# Patient Record
Sex: MALE | Race: WHITE
[De-identification: names, ages, dates, MRNs, and addresses within clinical notes are randomized per-mention and may not be internally consistent; named-entity substitution may affect disease eponyms.]

---

## 2017-08-17 ENCOUNTER — HOSPITAL ENCOUNTER (INPATIENT)
Dept: HOSPITAL 74 - YASAS | Age: 62
LOS: 4 days | Discharge: HOME | DRG: 897 | End: 2017-08-21
Attending: INTERNAL MEDICINE | Admitting: INTERNAL MEDICINE
Payer: COMMERCIAL

## 2017-08-17 VITALS — BODY MASS INDEX: 27.9 KG/M2

## 2017-08-17 DIAGNOSIS — S29.011D: ICD-10-CM

## 2017-08-17 DIAGNOSIS — G47.00: ICD-10-CM

## 2017-08-17 DIAGNOSIS — I10: ICD-10-CM

## 2017-08-17 DIAGNOSIS — Z86.2: ICD-10-CM

## 2017-08-17 DIAGNOSIS — F17.210: ICD-10-CM
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
## 2017-08-17 DIAGNOSIS — Z96.641: ICD-10-CM

## 2017-08-17 DIAGNOSIS — Z87.438: ICD-10-CM

## 2017-08-17 DIAGNOSIS — F10.230: Primary | ICD-10-CM

## 2017-08-17 LAB
ALBUMIN SERPL-MCNC: 2.1 G/DL (ref 3.4–5)
ALP SERPL-CCNC: 311 U/L (ref 45–117)
ALT SERPL-CCNC: 42 U/L (ref 12–78)
ANION GAP SERPL CALC-SCNC: 9 MMOL/L (ref 8–16)
ANISOCYTOSIS BLD QL: (no result)
AST SERPL-CCNC: 105 U/L (ref 15–37)
BILIRUB SERPL-MCNC: 2.8 MG/DL (ref 0.2–1)
CALCIUM SERPL-MCNC: 8.3 MG/DL (ref 8.5–10.1)
CO2 SERPL-SCNC: 23 MMOL/L (ref 21–32)
CREAT SERPL-MCNC: 0.8 MG/DL (ref 0.7–1.3)
DEPRECATED RDW RBC AUTO: 17.5 % (ref 11.9–15.9)
GLUCOSE SERPL-MCNC: 164 MG/DL (ref 74–106)
MACROCYTES BLD QL: (no result)
MCH RBC QN AUTO: 36 PG (ref 25.7–33.7)
MCHC RBC AUTO-ENTMCNC: 34.6 G/DL (ref 32–35.9)
MCV RBC: 104.1 FL (ref 80–96)
PLATELET # BLD AUTO: 48 K/MM3 (ref 134–434)
PLATELET BLD QL SMEAR: (no result)
PLATELET COMMENT2: (no result)
PLATELET COMMENT3: (no result)
PMV BLD: 11.2 FL (ref 7.5–11.1)
PROT SERPL-MCNC: 7.2 G/DL (ref 6.4–8.2)
WBC # BLD AUTO: 4 K/MM3 (ref 4–10)

## 2017-08-17 PROCEDURE — HZ2ZZZZ DETOXIFICATION SERVICES FOR SUBSTANCE ABUSE TREATMENT: ICD-10-PCS | Performed by: SURGERY

## 2017-08-17 RX ADMIN — Medication SCH MG: at 22:33

## 2017-08-17 NOTE — HP
CIWA Score





- CIWA Score


Nausea/Vomitin-No Nausea/No Vomiting


Muscle Tremors: 4-Moderate,w/Arms Extend


Anxiety: 3


Agitation: 4-Moderately Restless


Paroxysmal Sweats: 3


Orientation: 0-Oriented


Tacttile Disturbances: 0-None


Auditory Disturbances: 0-None


Visual Disturbances: 0-None


Headache: 0-None Present


CIWA-Ar Total Score: 14





Admission ROS [facility]





- HPI


Chief Complaint: 


I know I have a problem and need detox. 


Allergies/Adverse Reactions: 


 Allergies











Allergy/AdvReac Type Severity Reaction Status Date / Time


 


No Known Allergies Allergy   Verified 17 14:30











History of Present Illness: 


pt is a 61yr old male with a history of alcohol dependence seeking detox for 

treatment. Pt received tx at [facility] ED on 17 for muscular pain to his 

left upper chest. cardiac issues.was ruled out. Diagnosis was muscular.  


Exam Limitations: No Limitations





- Ebola screening


Have you traveled outside of the country in the last 21 days: No


Have you had contact with anyone from an Ebola affected area: No


Have you been sick,other than usual withdrawal symptoms: No


Do you have a fever: No





- Review of Systems


Constitutional: Night Sweats, Changes in sleep


EENT: reports: Tearing, Nose Congestion


Respiratory: reports: Cough


Cardiac: reports: Syncope


GI: reports: Poor Appetite, Poor Fluid Intake


: reports: No Symptoms Reported


Musculoskeletal: reports: Joint Pain (right shoulder pain), Muscle Pain (left 

upper chest wall area.)


Integumentary: reports: Flushing, Sweating


Neuro: reports: Tingling, Tremors


Endocrine: reports: Excessive Sweating, Flushing, Intolerance to Cold, 

Intolerance to Heat


Hematology: reports: No Symptoms Reported, Anemia (h/o anemia in the past. not 

taking any iron supplement)


Psychiatric: reports: Judgement Intact, Mood/Affect Appropiate, Orientated x3, 

Agitated, Anxious


Other Systems: Reviewed and Negative





Patient History





- Patient Medical History


Hx Anemia: Yes (in the past )


Hx Asthma: No


Hx Chronic Obstructive Pulmonary Disease (COPD): No


Hx Cancer: No


Hx Cardiac Disorders: No


Hx Congestive Heart Failure: No


Hx Hypertension: No


Hx Hypercholesterolemia: No


Hx Pacemaker: No


HX Cerebrovascular Accident: No


Hx Seizures: No


Hx Dementia: No


Hx Diabetes: No


Hx Gastrointestinal Disorders: No


Hx Liver Disease: No


Hx Genitourinary Disorders: No


Hx Sexually Transmitted Disorders: No (clymadia/gonerrhea in the past )


Hx Renal Disease (ESRD): No


Hx Thyroid Disease: No


Hx Human Immunodeficiency Virus (HIV): No (negative)


Hx Hepatitis C: No (negative)


Hx Depression: No


Hx Suicide Attempt: No (denies)


Hx Bipolar Disorder: No


Hx Schizophrenia: No





- Patient Surgical History


Hx Orthopedic Surgery: Yes (rt total hip replacement )





- PPD History


Previous Implant?: Yes


Documented Results: Negative w/o proof


PPD to be Administered?: Yes





- Reproductive History


Patient is a Female of Child Bearing Age (11 -55 yrs old): No





- Smoking Cessation


Smoking history: Current every day smoker


Have you smoked in the past 12 months: Yes


Aproximately how many cigarettes per day: 2


Hx Chewing Tobacco Use: Yes


Initiated information on smoking cessation: Yes


'Breaking Loose' booklet given: 17





- Substance & Tx. History


Hx Alcohol Use: Yes


Hx Substance Use: No


Substance Use Type: Alcohol


Hx Substance Use Treatment: No (never in detox)





- Substances Abused


  ** Alcohol


Route: Oral


Frequency: Daily


Amount used: 1 six pack beer/1 pint congac /1 pint rum


Age of first use: 16


Date of Last Use: 17





Family Disease History





- Family Disease History


Family Disease History: Diabetes: Father, [place]: Brother (leukemia)





Admission Physical Exam [facility]





- Vital Signs


Vital Signs: 


 Vital Signs - 24 hr











  17





  12:44


 


Temperature 97 F L


 


Pulse Rate 97 H


 


Respiratory 20





Rate 


 


Blood Pressure 117/66














- Physical


General Appearance: Yes: Appropriately Dressed, Moderate Distress, Tremorous, 

Irritable, Sweating, Anxious


HEENTM: Yes: Hearing grossly Normal, Normal Voice, Nasal Congestion, Rhinorrhea


Respiratory: Yes: Lungs Clear, Normal Breath Sounds, No Respiratory Distress


Neck: Yes: No masses,lesions,Nodules


Breast: Yes: Within Normal Limits


Cardiology: Yes: Regular Rhythm, Regular Rate, S1, S2


Abdominal: Yes: Normal Bowel Sounds, Non Tender, Soft


Genitourinary: Yes: Within Normal Limits


Back: Yes: Normal Inspection


Musculoskeletal: Yes: full range of Motion, Muscle Pain (left upper chest area)


Extremities: Yes: Normal Capillary Refill, Normal Inspection, Non-Tender, 

Tremors


Neurological: Yes: Fully Oriented, Alert, Normal Response


Integumentary: Yes: Normal Color, Diaphoresis


Lymphatic: Yes: Within Normal Limits





- Diagnostic


(1) Alcohol dependence with uncomplicated withdrawal


Current Visit: Yes   Status: Chronic





(2) Nicotine dependence


Current Visit: Yes   Status: Chronic   Qualifiers: 


     Nicotine product type: cigarettes     Substance use status: uncomplicated 

       Qualified Code(s): F17.210 - Nicotine dependence, cigarettes, 

uncomplicated  





(3) Hypertension


Current Visit: Yes   Status: Chronic   Qualifiers: 


     Hypertension type: essential hypertension        Qualified Code(s): I10 - 

Essential (primary) hypertension  





(4) Chest wall muscle strain


Current Visit: Yes   Status: Acute   Qualifiers: 


     Encounter type: initial encounter        Qualified Code(s): S29.011A - 

Strain of muscle and tendon of front wall of thorax, initial encounter  


Comment: tx received at [facility] 17 no cardia issue; pt diagnosed with 

muscular strain











Cleared for Admission S





- Detox or Rehab


[facility] Level of Care: Medically Managed


Detox Regimen/Protocol: Librium





[facility] Breath Alcohol Content


Breath Alcohol Content: 0.204





Urine Drug Screen





- Results


Drug Screen Negative: No


Urine Drug Screen Results: BZO-Benzodiazepines

## 2017-08-18 LAB
APPEARANCE UR: CLEAR
BILIRUB UR STRIP.AUTO-MCNC: NEGATIVE MG/DL
COLOR UR: (no result)
HIV 1 & 2 AB: NEGATIVE
HIV 1 AGP24: NEGATIVE
KETONES UR QL STRIP: NEGATIVE
LEUKOCYTE ESTERASE UR QL STRIP.AUTO: NEGATIVE
NITRITE UR QL STRIP: NEGATIVE
PH UR: 6 [PH] (ref 5–8)
PROT UR QL STRIP: NEGATIVE
PROT UR QL STRIP: NEGATIVE
RBC # UR STRIP: NEGATIVE /UL
SP GR UR: <= 1.005 (ref 1–1.02)
UROBILINOGEN UR STRIP-MCNC: NEGATIVE MG/DL (ref 0.2–1)

## 2017-08-18 RX ADMIN — Medication SCH TAB: at 10:57

## 2017-08-18 RX ADMIN — FERROUS SULFATE TAB EC 324 MG (65 MG FE EQUIVALENT) SCH MG: 324 (65 FE) TABLET DELAYED RESPONSE at 13:00

## 2017-08-18 RX ADMIN — NICOTINE SCH: 7 PATCH TRANSDERMAL at 11:00

## 2017-08-18 RX ADMIN — FERROUS SULFATE TAB EC 324 MG (65 MG FE EQUIVALENT) SCH MG: 324 (65 FE) TABLET DELAYED RESPONSE at 18:35

## 2017-08-18 RX ADMIN — METOPROLOL SUCCINATE SCH MG: 100 TABLET, EXTENDED RELEASE ORAL at 10:57

## 2017-08-18 RX ADMIN — ZOLPIDEM TARTRATE PRN MG: 5 TABLET, COATED ORAL at 22:18

## 2017-08-18 RX ADMIN — Medication SCH MG: at 22:18

## 2017-08-18 NOTE — EKG
Test Reason : 

Blood Pressure : ***/*** mmHG

Vent. Rate : 073 BPM     Atrial Rate : 073 BPM

   P-R Int : 160 ms          QRS Dur : 092 ms

    QT Int : 414 ms       P-R-T Axes : 057 015 036 degrees

   QTc Int : 456 ms

 

NORMAL SINUS RHYTHM

NORMAL ECG

NO PREVIOUS ECGS AVAILABLE

Confirmed by AMADOR PACHECO MD ([id number]) on 8/18/2017 10:31:21 AM

 

Referred By:             Confirmed By:AMADOR PACHECO MD

## 2017-08-18 NOTE — CONSULT
[facility] Psychiatric Consult





- Data


Date of interview: 08/18/17


Admission source: [facility]


Identifying data: First admission to [facility] for this 62 y/o  male 

seeking detox treatment on [address] for alcohol and marihuana dependence.Patient 

is in a long standing common-law relationship,father of seven,domiciled and 

currently retired (supported on his pension benefits).


Substance Abuse History: Discussed with patient in this interview.Mr Donohue 

declares that this report is accurate about his addictions.  Smoking Cessation.

  Smoking history: Current every day smoker.  Have you smoked in the past 12 

months: Yes.  Aproximately how many cigarettes per day: 2.  Hx Chewing Tobacco 

Use: Yes.  Initiated information on smoking cessation: Yes.  'Breaking Loose' 

booklet given: 08/17/17.  - Substance & Tx. History.  Hx Alcohol Use: Yes.  Hx 

Substance Use: No.  Substance Use Type: Alcohol.  Hx Substance Use Treatment: 

No (never in detox).  - Substances Abused.  ** Alcohol.  Route: Oral.  Frequency

: Daily.  Amount used: 1 six pack beer/1 pint congac /1 pint rum.  Age of first 

use: 16.  Date of Last Use: 08/17/17


Medical History: Remarkable for hepatitis C,history of anemia,past treatment 

for chlamydia/gonorrhea and total right hip replacement (2001).


Psychiatric History: Patient denies.


Physical/Sexual Abuse/Trauma History: Patient denies.


Additional Comment: Urine Drug Screen Results: BZO-Benzodiazepines.Noted.





Mental Status Exam





- Mental Status Exam


Alert and Oriented to: Time, Place, Person


Cognitive Function: Good


Patient Appearance: Well Groomed


Mood: Nervous, Withdrawn, Anxious


Affect: Mood Congruent


Patient Behavior: Fatigued, Appropriate, Cooperative


Speech Pattern: Clear, Appropriate


Voice Loudness: Normal


Thought Process: Intact, Goal Oriented


Thought Disorder: Not Present


Hallucinations: Denies


Suicidal Ideation: Denies


Homicidal Ideation: Denies


Insight/Judgement: Poor


Sleep: Poorly, Difficulty falling asleep


Appetite: Good


Muscle strength/Tone: Normal


Gait/Station: Normal





Psychiatric Findings





- Problem List (Axis 1, 2,3)


(1) Alcohol dependence with uncomplicated withdrawal


Current Visit: Yes   Status: Acute





(2) Nicotine dependence


Current Visit: Yes   Status: Acute   Qualifiers: 


     Nicotine product type: cigarettes     Substance use status: uncomplicated 

       Qualified Code(s): F17.210 - Nicotine dependence, cigarettes, 

uncomplicated  





(3) Chest wall muscle strain


Current Visit: Yes   Status: Acute   Qualifiers: 


     Encounter type: initial encounter        Qualified Code(s): S29.011A - 

Strain of muscle and tendon of front wall of thorax, initial encounter  


Comment: tx received at [facility] 8/9/17 no cardia issue; pt diagnosed with 

muscular strain








(4) Hypertension


Current Visit: Yes   Status: Chronic   Qualifiers: 


     Hypertension type: essential hypertension        Qualified Code(s): I10 - 

Essential (primary) hypertension  





(5) Insomnia


Current Visit: Yes   Status: Acute   








- Initial Treatment Plan


Initial Treatment Plan: Psychoeducation.Detoxification.Ambien 5 mg po hs 

prn.Patient is made aware of potential for parasomnias.He is in agreement with 

this careplan.Observation.

## 2017-08-18 NOTE — PN
S CIWA





- CIWA Score


Nausea/Vomitin-No Nausea/No Vomiting


Muscle Tremors: 4-Moderate,w/Arms Extend


Anxiety: 3


Agitation: 4-Moderately Restless


Paroxysmal Sweats: 3


Orientation: 0-Oriented


Tacttile Disturbances: 0-None


Auditory Disturbances: 0-None


Visual Disturbances: 0-None


Headache: 0-None Present


CIWA-Ar Total Score: 14





[facility] Progress Note (SOAP)


Subjective: 


sweats


tired


agitation


interrupted sleep


body aches


Objective: 





17 11:23


 Vital Signs











Temperature  98.1 F   17 10:00


 


Pulse Rate  88   17 10:00


 


Respiratory Rate  18   17 10:00


 


Blood Pressure  135/73   17 10:00


 


O2 Sat by Pulse Oximetry (%)      








 Laboratory Tests











  17





  14:00 14:00 14:00


 


WBC   4.0 


 


RBC   2.84 L 


 


Hgb   10.2 L 


 


Hct   29.5 L 


 


MCV   104.1 H 


 


MCH   36.0 H 


 


MCHC   34.6 


 


RDW   17.5 H 


 


Plt Count   48 L 


 


MPV   11.2 H 


 


Platelet Estimate   Decreased 


 


Platelet Comment   No clotting detected 


 


Anisocytosis   1+ 


 


Macrocytosis   1+ 


 


Sodium   


 


Potassium   


 


Chloride   


 


Carbon Dioxide   


 


Anion Gap   


 


BUN   


 


Creatinine   


 


Creat Clearance w eGFR   


 


Random Glucose   


 


Calcium   


 


Total Bilirubin   


 


AST   


 


ALT   


 


Alkaline Phosphatase   


 


Total Protein   


 


Albumin   


 


Urine Color   


 


Urine Appearance   


 


Urine pH   


 


Urine Protein   


 


Urine Glucose (UA)   


 


Urine Ketones   


 


Urine Blood   


 


Urine Nitrite   


 


Urine Bilirubin   


 


Urine Urobilinogen   


 


Ur Leukocyte Esterase   


 


RPR Titer    Nonreactive


 


HIV 1&2 Antibody Screen  Negative  


 


HIV P24 Antigen  Negative  














  17





  14:00 23:56


 


WBC  


 


RBC  


 


Hgb  


 


Hct  


 


MCV  


 


MCH  


 


MCHC  


 


RDW  


 


Plt Count  


 


MPV  


 


Platelet Estimate  


 


Platelet Comment  


 


Anisocytosis  


 


Macrocytosis  


 


Sodium  144 


 


Potassium  4.4 


 


Chloride  112 H 


 


Carbon Dioxide  23 


 


Anion Gap  9 


 


BUN  10 


 


Creatinine  0.8 


 


Creat Clearance w eGFR  > 60 


 


Random Glucose  164 H 


 


Calcium  8.3 L 


 


Total Bilirubin  2.8 H 


 


AST  105 H 


 


ALT  42 


 


Alkaline Phosphatase  311 H 


 


Total Protein  7.2 


 


Albumin  2.1 L 


 


Urine Color   Straw


 


Urine Appearance   Clear


 


Urine pH   6.0


 


Urine Protein   Negative


 


Urine Glucose (UA)   Negative


 


Urine Ketones   Negative


 


Urine Blood   Negative


 


Urine Nitrite   Negative


 


Urine Bilirubin   Negative


 


Urine Urobilinogen   Negative


 


Ur Leukocyte Esterase   Negative


 


RPR Titer  


 


HIV 1&2 Antibody Screen  


 


HIV P24 Antigen  








low H:H; iron supplement ordered


awake/alert


ambulating


no acute distress


Assessment: 





17 11:23


withdrawal sx


Plan: 


continue detox


increase fluids


iron supplement ordered 


repeat labs for .

## 2017-08-19 RX ADMIN — CYCLOBENZAPRINE HYDROCHLORIDE SCH MG: 10 TABLET, FILM COATED ORAL at 22:11

## 2017-08-19 RX ADMIN — METOPROLOL SUCCINATE SCH MG: 100 TABLET, EXTENDED RELEASE ORAL at 10:11

## 2017-08-19 RX ADMIN — CYCLOBENZAPRINE HYDROCHLORIDE SCH MG: 10 TABLET, FILM COATED ORAL at 14:41

## 2017-08-19 RX ADMIN — FERROUS SULFATE TAB EC 324 MG (65 MG FE EQUIVALENT) SCH: 324 (65 FE) TABLET DELAYED RESPONSE at 17:55

## 2017-08-19 RX ADMIN — ZOLPIDEM TARTRATE PRN MG: 5 TABLET, COATED ORAL at 22:10

## 2017-08-19 RX ADMIN — Medication SCH MG: at 22:10

## 2017-08-19 RX ADMIN — Medication SCH TAB: at 10:11

## 2017-08-19 RX ADMIN — NICOTINE SCH: 7 PATCH TRANSDERMAL at 10:11

## 2017-08-19 RX ADMIN — FERROUS SULFATE TAB EC 324 MG (65 MG FE EQUIVALENT) SCH MG: 324 (65 FE) TABLET DELAYED RESPONSE at 11:40

## 2017-08-19 RX ADMIN — FERROUS SULFATE TAB EC 324 MG (65 MG FE EQUIVALENT) SCH MG: 324 (65 FE) TABLET DELAYED RESPONSE at 07:37

## 2017-08-19 NOTE — PN
[facility] CIWA





- CIWA Score


Nausea/Vomiting: 3


Muscle Tremors: 3


Anxiety: 3


Agitation: 2


Paroxysmal Sweats: 1-Minimal Palms Moist


Orientation: 0-Oriented


Tacttile Disturbances: 1-Very Mild Itch/Numbness


Auditory Disturbances: 1-Very Mild


Visual Disturbances: 1-Very Mild Sensitivity


Headache: 2-Mild


CIWA-Ar Total Score: 17





BHS Progress Note (SOAP)


Subjective: 


alert,irritable,anxious,interrupted sleep,tremor


Objective: 


08/19/17 13:49


 Vital Signs











Temperature  98.1 F   08/19/17 09:56


 


Pulse Rate  90   08/19/17 09:56


 


Respiratory Rate  16   08/19/17 09:56


 


Blood Pressure  117/79   08/19/17 09:56


 


O2 Sat by Pulse Oximetry (%)      








ekg nsr,normal ecg





08/19/17 13:50


 Laboratory Last Values











WBC  4.0 K/mm3 (4.0-10.0)   08/17/17  14:00    


 


RBC  2.84 M/mm3 (4.00-5.60)  L  08/17/17  14:00    


 


Hgb  10.2 GM/dL (11.7-16.9)  L  08/17/17  14:00    


 


Hct  29.5 % (35.4-49)  L  08/17/17  14:00    


 


MCV  104.1 fl (80-96)  H  08/17/17  14:00    


 


MCH  36.0 pg (25.7-33.7)  H  08/17/17  14:00    


 


MCHC  34.6 g/dl (32.0-35.9)   08/17/17  14:00    


 


RDW  17.5 % (11.9-15.9)  H  08/17/17  14:00    


 


Plt Count  48 K/MM3 (134-434)  L  08/17/17  14:00    


 


MPV  11.2 fl (7.5-11.1)  H  08/17/17  14:00    


 


Platelet Estimate  Decreased   08/17/17  14:00    


 


Platelet Comment  Few large plts   08/17/17  14:00    


 


Platelet Comment  No clotting detected   08/17/17  14:00    


 


Anisocytosis  1+   08/17/17  14:00    


 


Macrocytosis  1+   08/17/17  14:00    


 


Sodium  144 mmol/L (136-145)   08/17/17  14:00    


 


Potassium  4.4 mmol/L (3.5-5.1)   08/17/17  14:00    


 


Chloride  112 mmol/L ()  H  08/17/17  14:00    


 


Carbon Dioxide  23 mmol/L (21-32)   08/17/17  14:00    


 


Anion Gap  9  (8-16)   08/17/17  14:00    


 


BUN  10 mg/dL (7-18)   08/17/17  14:00    


 


Creatinine  0.8 mg/dL (0.7-1.3)   08/17/17  14:00    


 


Creat Clearance w eGFR  > 60  (>60)   08/17/17  14:00    


 


Random Glucose  164 mg/dL ()  H  08/17/17  14:00    


 


Calcium  8.3 mg/dL (8.5-10.1)  L  08/17/17  14:00    


 


Total Bilirubin  2.8 mg/dL (0.2-1.0)  H  08/17/17  14:00    


 


AST  105 U/L (15-37)  H  08/17/17  14:00    


 


ALT  42 U/L (12-78)   08/17/17  14:00    


 


Alkaline Phosphatase  311 U/L ()  H  08/17/17  14:00    


 


Total Protein  7.2 g/dl (6.4-8.2)   08/17/17  14:00    


 


Albumin  2.1 g/dl (3.4-5.0)  L  08/17/17  14:00    


 


Urine Color  Straw   08/17/17  23:56    


 


Urine Appearance  Clear   08/17/17  23:56    


 


Urine pH  6.0  (5.0-8.0)   08/17/17  23:56    


 


Ur Specific Gravity  <= 1.005  (1.005-1.025)   08/17/17  23:56    


 


Urine Protein  Negative  (NEGATIVE)   08/17/17  23:56    


 


Urine Glucose (UA)  Negative  (NEGATIVE)   08/17/17  23:56    


 


Urine Ketones  Negative  (NEGATIVE)   08/17/17  23:56    


 


Urine Blood  Negative  (NEGATIVE)   08/17/17  23:56    


 


Urine Nitrite  Negative  (NEGATIVE)   08/17/17  23:56    


 


Urine Bilirubin  Negative  (NEGATIVE)   08/17/17  23:56    


 


Urine Urobilinogen  Negative mg/dL (0.2-1.0)   08/17/17  23:56    


 


Ur Leukocyte Esterase  Negative  (NEGATIVE)   08/17/17  23:56    


 


RPR Titer  Nonreactive  (NONREACTIVE)   08/17/17  14:00    


 


HIV 1&2 Antibody Screen  Negative   08/17/17  14:00    


 


HIV P24 Antigen  Negative   08/17/17  14:00    











Assessment: 





08/19/17 13:52


withdrawal symptom


Plan: 


continue detox,repeat cbc,cmp,inr in am

## 2017-08-20 LAB
ALBUMIN SERPL-MCNC: 1.9 G/DL (ref 3.4–5)
ALP SERPL-CCNC: 220 U/L (ref 45–117)
ALT SERPL-CCNC: 33 U/L (ref 12–78)
ANION GAP SERPL CALC-SCNC: 7 MMOL/L (ref 8–16)
AST SERPL-CCNC: 77 U/L (ref 15–37)
BILIRUB SERPL-MCNC: 3.9 MG/DL (ref 0.2–1)
CALCIUM SERPL-MCNC: 8.3 MG/DL (ref 8.5–10.1)
CO2 SERPL-SCNC: 27 MMOL/L (ref 21–32)
CREAT SERPL-MCNC: 0.7 MG/DL (ref 0.7–1.3)
DEPRECATED RDW RBC AUTO: 16.4 % (ref 11.9–15.9)
GLUCOSE SERPL-MCNC: 94 MG/DL (ref 74–106)
MCH RBC QN AUTO: 36 PG (ref 25.7–33.7)
MCHC RBC AUTO-ENTMCNC: 34.5 G/DL (ref 32–35.9)
MCV RBC: 104.4 FL (ref 80–96)
PLATELET # BLD AUTO: 42 K/MM3 (ref 134–434)
PLATELET BLD QL SMEAR: (no result)
PMV BLD: 11.4 FL (ref 7.5–11.1)
PROT SERPL-MCNC: 6.5 G/DL (ref 6.4–8.2)
TOTAL CELLS COUNTED BLD: 100
WBC # BLD AUTO: 3.3 K/MM3 (ref 4–10)

## 2017-08-20 RX ADMIN — CYCLOBENZAPRINE HYDROCHLORIDE SCH MG: 10 TABLET, FILM COATED ORAL at 06:58

## 2017-08-20 RX ADMIN — LACTULOSE SCH GM: 20 SOLUTION ORAL at 11:13

## 2017-08-20 RX ADMIN — METOPROLOL SUCCINATE SCH MG: 100 TABLET, EXTENDED RELEASE ORAL at 10:21

## 2017-08-20 RX ADMIN — LACTULOSE SCH GM: 20 SOLUTION ORAL at 22:08

## 2017-08-20 RX ADMIN — FERROUS SULFATE TAB EC 324 MG (65 MG FE EQUIVALENT) SCH: 324 (65 FE) TABLET DELAYED RESPONSE at 17:17

## 2017-08-20 RX ADMIN — NICOTINE SCH: 7 PATCH TRANSDERMAL at 10:21

## 2017-08-20 RX ADMIN — FERROUS SULFATE TAB EC 324 MG (65 MG FE EQUIVALENT) SCH MG: 324 (65 FE) TABLET DELAYED RESPONSE at 11:13

## 2017-08-20 RX ADMIN — CYCLOBENZAPRINE HYDROCHLORIDE SCH: 10 TABLET, FILM COATED ORAL at 22:09

## 2017-08-20 RX ADMIN — Medication SCH TAB: at 10:21

## 2017-08-20 RX ADMIN — Medication SCH MG: at 22:07

## 2017-08-20 RX ADMIN — FERROUS SULFATE TAB EC 324 MG (65 MG FE EQUIVALENT) SCH MG: 324 (65 FE) TABLET DELAYED RESPONSE at 07:10

## 2017-08-20 RX ADMIN — CYCLOBENZAPRINE HYDROCHLORIDE SCH MG: 10 TABLET, FILM COATED ORAL at 13:23

## 2017-08-20 NOTE — PN
BHS Progress Note (SOAP)


Subjective: 


ALERT,IRRITABLE,ANXIOUS,INTERRUPTED SLEEP,


Objective: 





08/20/17 13:59


 Vital Signs











Temperature  97.9 F   08/20/17 10:44


 


Pulse Rate  73   08/20/17 10:44


 


Respiratory Rate  20 08/20/17 10:44


 


Blood Pressure  103/66   08/20/17 10:44


 


O2 Sat by Pulse Oximetry (%)      








 Abnormal Lab Results











  08/19/17 08/20/17 08/20/17





  16:40 07:45 07:45


 


WBC   3.3 L 


 


RBC   2.83 L 


 


Hgb   10.2 L 


 


Hct   29.5 L 


 


MCV   104.4 H 


 


MCH   36.0 H 


 


RDW   16.4 H 


 


Plt Count   42 L 


 


MPV   11.4 H 


 


Monocytes % (Manual)   11 H 


 


Anion Gap    7 L


 


Calcium    8.3 L


 


Total Bilirubin    3.9 H D


 


AST    77 H D


 


Alkaline Phosphatase    220 H D


 


Ammonia  51.05 H  


 


Albumin    1.9 L











Assessment: 





08/20/17 13:59


CONTINUE DETOX


Plan: 


CONTINUE DETOX,LACTULOSE 20 GRAMS PO BID,DISCHARGE IN AM

## 2017-08-21 ENCOUNTER — HOSPITAL ENCOUNTER (INPATIENT)
Dept: HOSPITAL 74 - YASAS | Age: 62
LOS: 10 days | Discharge: HOME | DRG: 895 | End: 2017-08-31
Attending: PSYCHIATRY & NEUROLOGY | Admitting: PSYCHIATRY & NEUROLOGY
Payer: COMMERCIAL

## 2017-08-21 VITALS — BODY MASS INDEX: 28 KG/M2

## 2017-08-21 VITALS — DIASTOLIC BLOOD PRESSURE: 51 MMHG | SYSTOLIC BLOOD PRESSURE: 94 MMHG | TEMPERATURE: 97.2 F | HEART RATE: 61 BPM

## 2017-08-21 DIAGNOSIS — F17.210: ICD-10-CM

## 2017-08-21 DIAGNOSIS — K64.8: ICD-10-CM

## 2017-08-21 DIAGNOSIS — G47.00: ICD-10-CM

## 2017-08-21 DIAGNOSIS — L25.9: ICD-10-CM

## 2017-08-21 DIAGNOSIS — F31.9: ICD-10-CM

## 2017-08-21 DIAGNOSIS — R07.89: ICD-10-CM

## 2017-08-21 DIAGNOSIS — I10: ICD-10-CM

## 2017-08-21 DIAGNOSIS — F10.20: Primary | ICD-10-CM

## 2017-08-21 DIAGNOSIS — F32.9: ICD-10-CM

## 2017-08-21 PROCEDURE — HZ42ZZZ GROUP COUNSELING FOR SUBSTANCE ABUSE TREATMENT, COGNITIVE-BEHAVIORAL: ICD-10-PCS | Performed by: PSYCHIATRY & NEUROLOGY

## 2017-08-21 PROCEDURE — G0009 ADMIN PNEUMOCOCCAL VACCINE: HCPCS

## 2017-08-21 RX ADMIN — Medication SCH MG: at 21:27

## 2017-08-21 RX ADMIN — LACTULOSE SCH GM: 20 SOLUTION ORAL at 09:22

## 2017-08-21 RX ADMIN — FERROUS SULFATE TAB EC 324 MG (65 MG FE EQUIVALENT) SCH MG: 324 (65 FE) TABLET DELAYED RESPONSE at 16:56

## 2017-08-21 RX ADMIN — FERROUS SULFATE TAB EC 324 MG (65 MG FE EQUIVALENT) SCH MG: 324 (65 FE) TABLET DELAYED RESPONSE at 07:19

## 2017-08-21 RX ADMIN — NICOTINE SCH: 7 PATCH TRANSDERMAL at 09:22

## 2017-08-21 RX ADMIN — CYCLOBENZAPRINE HYDROCHLORIDE SCH MG: 10 TABLET, FILM COATED ORAL at 07:19

## 2017-08-21 RX ADMIN — METOPROLOL SUCCINATE SCH MG: 100 TABLET, EXTENDED RELEASE ORAL at 09:20

## 2017-08-21 RX ADMIN — Medication SCH TAB: at 09:20

## 2017-08-21 RX ADMIN — LACTULOSE SCH GM: 20 SOLUTION ORAL at 21:26

## 2017-08-21 RX ADMIN — FUROSEMIDE SCH MG: 40 TABLET ORAL at 16:56

## 2017-08-21 NOTE — HP
[facility] MD Rehab Assess/Revision





- Admission History


Admitted to Rehab from: [facility] [address] [facility]


Date of Admission to Rehab: 08/21/17





- Vital signs


Vital Signs: 


 Vital Signs











 Period  Temp  Pulse  Resp  BP Sys/Camara  Pulse Ox


 


 Last 24 Hr  97.6 F  59  20  148/62  














- Findings


Detox History & Physical reviewed: Yes


Concur with findings: Yes


Comments/Additional Findings: for rehab as protocol

## 2017-08-21 NOTE — DS
[facility] Detox Discharge Summary


Admission Date: 


08/17/17





Discharge Date: 08/21/17





- History


Present History: Alcohol Dependence





- Physical Exam Results


Vital Signs: 


 Vital Signs











Temperature  96.4 F L  08/21/17 06:52


 


Pulse Rate  64   08/21/17 06:52


 


Respiratory Rate  16   08/21/17 06:52


 


Blood Pressure  100/50   08/21/17 06:52


 


O2 Sat by Pulse Oximetry (%)      














- Treatment


Hospital Course: Detox Protocol Followed, Detoxed Safely, Responded well, 

Discharged Condition Good, Rehab Referral Accepted





- Medication


Discharge Medications: 


Ambulatory Orders





Metoprolol Succinate [Toprol XL -] 100 mg PO DAILY 08/17/17 


Ferrous Sulfate [Feosol] 325 mg PO TIDCM #90 mg 08/21/17 


Lactulose (Oral Use) [Cephulac -] 20 gm PO BID #1 bottle 08/21/17 











- Diagnosis


(1) Alcohol dependence with uncomplicated withdrawal


Current Visit: Yes   Status: Chronic





(2) Nicotine dependence


Current Visit: Yes   Status: Chronic   Qualifiers: 


     Nicotine product type: cigarettes     Substance use status: uncomplicated 

       Qualified Code(s): F17.210 - Nicotine dependence, cigarettes, 

uncomplicated  





(3) Hypertension


Current Visit: Yes   Status: Chronic   Qualifiers: 


     Hypertension type: essential hypertension        Qualified Code(s): I10 - 

Essential (primary) hypertension  





(4) Chest wall muscle strain


Current Visit: Yes   Status: Acute   Qualifiers: 


     Encounter type: initial encounter        Qualified Code(s): S29.011A - 

Strain of muscle and tendon of front wall of thorax, initial encounter  








- AMA


Did Patient Leave Against Medical Advice: No

## 2017-08-22 RX ADMIN — LACTULOSE SCH GM: 20 SOLUTION ORAL at 09:44

## 2017-08-22 RX ADMIN — Medication SCH TAB: at 09:44

## 2017-08-22 RX ADMIN — METOPROLOL SUCCINATE SCH: 100 TABLET, EXTENDED RELEASE ORAL at 09:46

## 2017-08-22 RX ADMIN — LACTULOSE SCH GM: 20 SOLUTION ORAL at 21:45

## 2017-08-22 RX ADMIN — Medication SCH MG: at 21:44

## 2017-08-22 RX ADMIN — FERROUS SULFATE TAB EC 324 MG (65 MG FE EQUIVALENT) SCH MG: 324 (65 FE) TABLET DELAYED RESPONSE at 17:17

## 2017-08-22 RX ADMIN — FERROUS SULFATE TAB EC 324 MG (65 MG FE EQUIVALENT) SCH MG: 324 (65 FE) TABLET DELAYED RESPONSE at 12:19

## 2017-08-22 RX ADMIN — FUROSEMIDE SCH: 40 TABLET ORAL at 09:45

## 2017-08-22 RX ADMIN — CYCLOBENZAPRINE HYDROCHLORIDE PRN MG: 10 TABLET, FILM COATED ORAL at 01:01

## 2017-08-22 RX ADMIN — FERROUS SULFATE TAB EC 324 MG (65 MG FE EQUIVALENT) SCH MG: 324 (65 FE) TABLET DELAYED RESPONSE at 07:03

## 2017-08-22 RX ADMIN — IBUPROFEN PRN MG: 400 TABLET, FILM COATED ORAL at 00:58

## 2017-08-22 NOTE — HP
Psychiatrist Admission





- Data


Date of interview: 08/22/17


Admission source: [facility]


Identifying data: This is the first [facility] inpatient rehabilitation admission for 

this 61 year old  male,  father of 7, residing in [facility]. 

Patient is in a long standing common-law relationship and currently retired (

supported on his pension benefits)


Medical History: Remarkable for hepatitis C,history of anemia,past treatment 

for chlamydia/gonorrhea and total right hip replacement (2001). Smokes 2 

cigaretets a day.


Psychiatric History: Patient denies history of psychiatric treatment.


Physical/Sexual Abuse/Trauma History: Patient denies history of abuse.


Vital Signs: 


 Vital Signs - 24 hr











  08/21/17 08/21/17 08/22/17





  13:06 16:00 03:30


 


Temperature 97.6 F  


 


Pulse Rate 59 L 57 L 


 


Respiratory 20  20





Rate   


 


Blood Pressure 148/62 101/58 














  08/22/17





  06:58


 


Temperature 97.7 F


 


Pulse Rate 64


 


Respiratory 18





Rate 


 


Blood Pressure 94/62











Allergies/Adverse Reactions: 


 Allergies











Allergy/AdvReac Type Severity Reaction Status Date / Time


 


No Known Allergies Allergy   Verified 08/17/17 14:30











Date of last physical exam: 08/17/17


Concur with the findings of this exam: Yes





- Substance Abuse/Tx History


Hx Alcohol Use: Yes


Hx Substance Use: No


Substance Use Type: Alcohol (1 six pack beer/1 pint congac /1 pint rum.  Age of 

first use - 16)


Hx Substance Use Treatment: No (this is his first rehab.)





- Admission Criteria


Previous failed treatment: No


Poor recovery environment: Yes


Comorbidities: No


Lacks judgement: Yes





Mental Status Exam





- Mental Status Exam


Alert and Oriented to: Time, Place, Person


Cognitive Function: Grossly Intact


Patient Appearance: Well Groomed


Mood: Sad


Affect: Appropriate, Mood Congruent


Patient Behavior: Appropriate, Cooperative


Speech Pattern: Appropriate


Voice Loudness: Normal


Thought Process: Goal Oriented


Thought Disorder: Not Present


Hallucinations: Denies


Suicidal Ideation: Denies


Homicidal Ideation: Denies


Insight/Judgement: Fair


Sleep: Fair


Appetite: Fair


Muscle strength/Tone: Normal


Gait/Station: Normal





Psychiatric Findings





- Problem List (Axis 1, 2,3)


(1) Hypertension


Current Visit: No   Status: Chronic   Qualifiers: 


   





(2) Nicotine dependence


Current Visit: No   Status: Chronic   Qualifiers: 


   





(3) Alcohol dependence


Current Visit: Yes   Status: Acute   








- Initial Treatment Plan


Initial Treatment Plan: will monitor rpogress as needed.

## 2017-08-23 RX ADMIN — METOPROLOL SUCCINATE SCH MG: 100 TABLET, EXTENDED RELEASE ORAL at 11:19

## 2017-08-23 RX ADMIN — LACTULOSE SCH: 20 SOLUTION ORAL at 23:50

## 2017-08-23 RX ADMIN — Medication SCH: at 23:50

## 2017-08-23 RX ADMIN — FERROUS SULFATE TAB EC 324 MG (65 MG FE EQUIVALENT) SCH MG: 324 (65 FE) TABLET DELAYED RESPONSE at 07:29

## 2017-08-23 RX ADMIN — FERROUS SULFATE TAB EC 324 MG (65 MG FE EQUIVALENT) SCH MG: 324 (65 FE) TABLET DELAYED RESPONSE at 12:20

## 2017-08-23 RX ADMIN — Medication SCH TAB: at 09:45

## 2017-08-23 RX ADMIN — FUROSEMIDE SCH MG: 40 TABLET ORAL at 09:45

## 2017-08-23 RX ADMIN — FERROUS SULFATE TAB EC 324 MG (65 MG FE EQUIVALENT) SCH MG: 324 (65 FE) TABLET DELAYED RESPONSE at 17:04

## 2017-08-23 RX ADMIN — LACTULOSE SCH GM: 20 SOLUTION ORAL at 09:45

## 2017-08-24 RX ADMIN — FERROUS SULFATE TAB EC 324 MG (65 MG FE EQUIVALENT) SCH MG: 324 (65 FE) TABLET DELAYED RESPONSE at 07:17

## 2017-08-24 RX ADMIN — FERROUS SULFATE TAB EC 324 MG (65 MG FE EQUIVALENT) SCH MG: 324 (65 FE) TABLET DELAYED RESPONSE at 16:39

## 2017-08-24 RX ADMIN — IBUPROFEN PRN MG: 400 TABLET, FILM COATED ORAL at 21:54

## 2017-08-24 RX ADMIN — LACTULOSE SCH GM: 20 SOLUTION ORAL at 21:53

## 2017-08-24 RX ADMIN — FLUOCINONIDE SCH APPLIC: 0.5 OINTMENT TOPICAL at 21:52

## 2017-08-24 RX ADMIN — Medication SCH TAB: at 09:38

## 2017-08-24 RX ADMIN — HYDROCORTISONE SCH APPLIC: 25 CREAM TOPICAL at 13:48

## 2017-08-24 RX ADMIN — LACTULOSE SCH GM: 20 SOLUTION ORAL at 09:38

## 2017-08-24 RX ADMIN — Medication SCH MG: at 21:53

## 2017-08-24 RX ADMIN — METOPROLOL SUCCINATE SCH MG: 100 TABLET, EXTENDED RELEASE ORAL at 09:38

## 2017-08-24 RX ADMIN — FERROUS SULFATE TAB EC 324 MG (65 MG FE EQUIVALENT) SCH MG: 324 (65 FE) TABLET DELAYED RESPONSE at 12:06

## 2017-08-24 RX ADMIN — FLUOCINONIDE SCH APPLIC: 0.5 OINTMENT TOPICAL at 13:48

## 2017-08-24 NOTE — PN
BHS Progress Note


Note: 


hemorrhoids 


contact dermatitis on face


treatment anusol cream 2.5%


              lidex ointment 0.5% bid

## 2017-08-25 RX ADMIN — FERROUS SULFATE TAB EC 324 MG (65 MG FE EQUIVALENT) SCH MG: 324 (65 FE) TABLET DELAYED RESPONSE at 07:01

## 2017-08-25 RX ADMIN — Medication SCH TAB: at 09:41

## 2017-08-25 RX ADMIN — HYDROCORTISONE SCH APPLIC: 25 CREAM TOPICAL at 09:42

## 2017-08-25 RX ADMIN — LACTULOSE SCH GM: 20 SOLUTION ORAL at 09:41

## 2017-08-25 RX ADMIN — FLUOCINONIDE SCH APPLIC: 0.5 OINTMENT TOPICAL at 21:45

## 2017-08-25 RX ADMIN — LACTULOSE SCH GM: 20 SOLUTION ORAL at 21:45

## 2017-08-25 RX ADMIN — FLUOCINONIDE SCH APPLIC: 0.5 OINTMENT TOPICAL at 09:42

## 2017-08-25 RX ADMIN — FERROUS SULFATE TAB EC 324 MG (65 MG FE EQUIVALENT) SCH MG: 324 (65 FE) TABLET DELAYED RESPONSE at 16:56

## 2017-08-25 RX ADMIN — Medication SCH MG: at 21:45

## 2017-08-25 RX ADMIN — FERROUS SULFATE TAB EC 324 MG (65 MG FE EQUIVALENT) SCH MG: 324 (65 FE) TABLET DELAYED RESPONSE at 12:42

## 2017-08-25 RX ADMIN — METOPROLOL SUCCINATE SCH MG: 100 TABLET, EXTENDED RELEASE ORAL at 09:41

## 2017-08-26 RX ADMIN — Medication SCH TAB: at 09:22

## 2017-08-26 RX ADMIN — METOPROLOL SUCCINATE SCH MG: 100 TABLET, EXTENDED RELEASE ORAL at 09:22

## 2017-08-26 RX ADMIN — CYCLOBENZAPRINE HYDROCHLORIDE PRN MG: 10 TABLET, FILM COATED ORAL at 15:41

## 2017-08-26 RX ADMIN — FLUOCINONIDE SCH APPLIC: 0.5 OINTMENT TOPICAL at 09:23

## 2017-08-26 RX ADMIN — FERROUS SULFATE TAB EC 324 MG (65 MG FE EQUIVALENT) SCH MG: 324 (65 FE) TABLET DELAYED RESPONSE at 07:04

## 2017-08-26 RX ADMIN — LACTULOSE SCH GM: 20 SOLUTION ORAL at 09:22

## 2017-08-26 RX ADMIN — FERROUS SULFATE TAB EC 324 MG (65 MG FE EQUIVALENT) SCH MG: 324 (65 FE) TABLET DELAYED RESPONSE at 12:55

## 2017-08-26 RX ADMIN — FERROUS SULFATE TAB EC 324 MG (65 MG FE EQUIVALENT) SCH MG: 324 (65 FE) TABLET DELAYED RESPONSE at 17:58

## 2017-08-26 RX ADMIN — Medication SCH MG: at 21:42

## 2017-08-26 RX ADMIN — LACTULOSE SCH GM: 20 SOLUTION ORAL at 21:42

## 2017-08-26 RX ADMIN — FLUOCINONIDE SCH APPLIC: 0.5 OINTMENT TOPICAL at 21:42

## 2017-08-26 RX ADMIN — HYDROCORTISONE SCH APPLIC: 25 CREAM TOPICAL at 09:23

## 2017-08-27 RX ADMIN — FERROUS SULFATE TAB EC 324 MG (65 MG FE EQUIVALENT) SCH MG: 324 (65 FE) TABLET DELAYED RESPONSE at 07:16

## 2017-08-27 RX ADMIN — FLUOCINONIDE SCH: 0.5 OINTMENT TOPICAL at 22:39

## 2017-08-27 RX ADMIN — FERROUS SULFATE TAB EC 324 MG (65 MG FE EQUIVALENT) SCH MG: 324 (65 FE) TABLET DELAYED RESPONSE at 12:56

## 2017-08-27 RX ADMIN — Medication SCH MG: at 22:05

## 2017-08-27 RX ADMIN — FERROUS SULFATE TAB EC 324 MG (65 MG FE EQUIVALENT) SCH: 324 (65 FE) TABLET DELAYED RESPONSE at 18:30

## 2017-08-27 RX ADMIN — FLUOCINONIDE SCH APPLIC: 0.5 OINTMENT TOPICAL at 09:28

## 2017-08-27 RX ADMIN — LACTULOSE SCH GM: 20 SOLUTION ORAL at 22:05

## 2017-08-27 RX ADMIN — HYDROCORTISONE SCH APPLIC: 25 CREAM TOPICAL at 09:28

## 2017-08-27 RX ADMIN — LACTULOSE SCH GM: 20 SOLUTION ORAL at 09:27

## 2017-08-27 RX ADMIN — METOPROLOL SUCCINATE SCH MG: 100 TABLET, EXTENDED RELEASE ORAL at 09:26

## 2017-08-27 RX ADMIN — Medication SCH TAB: at 09:26

## 2017-08-28 RX ADMIN — LACTULOSE SCH GM: 20 SOLUTION ORAL at 09:53

## 2017-08-28 RX ADMIN — FERROUS SULFATE TAB EC 324 MG (65 MG FE EQUIVALENT) SCH MG: 324 (65 FE) TABLET DELAYED RESPONSE at 07:18

## 2017-08-28 RX ADMIN — FERROUS SULFATE TAB EC 324 MG (65 MG FE EQUIVALENT) SCH MG: 324 (65 FE) TABLET DELAYED RESPONSE at 12:49

## 2017-08-28 RX ADMIN — FLUOCINONIDE SCH APPLIC: 0.5 OINTMENT TOPICAL at 22:02

## 2017-08-28 RX ADMIN — METOPROLOL SUCCINATE SCH MG: 100 TABLET, EXTENDED RELEASE ORAL at 09:53

## 2017-08-28 RX ADMIN — Medication SCH MG: at 22:01

## 2017-08-28 RX ADMIN — HYDROCORTISONE SCH: 25 CREAM TOPICAL at 10:17

## 2017-08-28 RX ADMIN — FERROUS SULFATE TAB EC 324 MG (65 MG FE EQUIVALENT) SCH MG: 324 (65 FE) TABLET DELAYED RESPONSE at 17:30

## 2017-08-28 RX ADMIN — LACTULOSE SCH GM: 20 SOLUTION ORAL at 22:01

## 2017-08-28 RX ADMIN — FLUOCINONIDE SCH APPLIC: 0.5 OINTMENT TOPICAL at 09:53

## 2017-08-28 RX ADMIN — Medication SCH TAB: at 09:53

## 2017-08-28 NOTE — PN
BHS Progress Note


Note: 


pain in left chest wall,reach out to  object on the floor prior comning 

for detox 


pain related to movement with pain on palpation


lung clear no wheezing


impression muscle strain


motrin 400 mgs po q 6 hrs prn for pain


flexeril 10 mgs tid prn

## 2017-08-29 LAB
ALBUMIN SERPL-MCNC: 2.3 G/DL (ref 3.4–5)
ALP SERPL-CCNC: 211 U/L (ref 45–117)
ALT SERPL-CCNC: 53 U/L (ref 12–78)
ANION GAP SERPL CALC-SCNC: 7 MMOL/L (ref 8–16)
AST SERPL-CCNC: 94 U/L (ref 15–37)
BILIRUB SERPL-MCNC: 3.9 MG/DL (ref 0.2–1)
CALCIUM SERPL-MCNC: 9.1 MG/DL (ref 8.5–10.1)
CO2 SERPL-SCNC: 27 MMOL/L (ref 21–32)
CREAT SERPL-MCNC: 0.8 MG/DL (ref 0.7–1.3)
GLUCOSE SERPL-MCNC: 104 MG/DL (ref 74–106)
PROT SERPL-MCNC: 7.8 G/DL (ref 6.4–8.2)

## 2017-08-29 RX ADMIN — METOPROLOL SUCCINATE SCH MG: 100 TABLET, EXTENDED RELEASE ORAL at 10:06

## 2017-08-29 RX ADMIN — LACTULOSE SCH GM: 20 SOLUTION ORAL at 10:06

## 2017-08-29 RX ADMIN — Medication SCH MG: at 21:39

## 2017-08-29 RX ADMIN — FERROUS SULFATE TAB EC 324 MG (65 MG FE EQUIVALENT) SCH MG: 324 (65 FE) TABLET DELAYED RESPONSE at 07:10

## 2017-08-29 RX ADMIN — LACTULOSE SCH GM: 20 SOLUTION ORAL at 21:39

## 2017-08-29 RX ADMIN — Medication SCH TAB: at 10:06

## 2017-08-29 RX ADMIN — HYDROCORTISONE SCH APPLIC: 25 CREAM TOPICAL at 10:07

## 2017-08-29 RX ADMIN — FERROUS SULFATE TAB EC 324 MG (65 MG FE EQUIVALENT) SCH MG: 324 (65 FE) TABLET DELAYED RESPONSE at 11:51

## 2017-08-29 RX ADMIN — FLUOCINONIDE SCH APPLIC: 0.5 OINTMENT TOPICAL at 10:06

## 2017-08-29 RX ADMIN — FERROUS SULFATE TAB EC 324 MG (65 MG FE EQUIVALENT) SCH MG: 324 (65 FE) TABLET DELAYED RESPONSE at 17:04

## 2017-08-29 RX ADMIN — FLUOCINONIDE SCH APPLIC: 0.5 OINTMENT TOPICAL at 21:39

## 2017-08-30 VITALS — HEART RATE: 56 BPM

## 2017-08-30 RX ADMIN — IBUPROFEN PRN MG: 400 TABLET, FILM COATED ORAL at 21:28

## 2017-08-30 RX ADMIN — LACTULOSE SCH GM: 20 SOLUTION ORAL at 21:27

## 2017-08-30 RX ADMIN — Medication SCH MG: at 21:30

## 2017-08-30 RX ADMIN — FERROUS SULFATE TAB EC 324 MG (65 MG FE EQUIVALENT) SCH MG: 324 (65 FE) TABLET DELAYED RESPONSE at 13:00

## 2017-08-30 RX ADMIN — HYDROCORTISONE SCH APPLIC: 25 CREAM TOPICAL at 09:51

## 2017-08-30 RX ADMIN — FLUOCINONIDE SCH APPLIC: 0.5 OINTMENT TOPICAL at 09:51

## 2017-08-30 RX ADMIN — Medication SCH TAB: at 09:51

## 2017-08-30 RX ADMIN — LACTULOSE SCH GM: 20 SOLUTION ORAL at 09:51

## 2017-08-30 RX ADMIN — FLUOCINONIDE SCH APPLIC: 0.5 OINTMENT TOPICAL at 21:30

## 2017-08-30 RX ADMIN — METOPROLOL SUCCINATE SCH MG: 100 TABLET, EXTENDED RELEASE ORAL at 09:51

## 2017-08-30 RX ADMIN — FERROUS SULFATE TAB EC 324 MG (65 MG FE EQUIVALENT) SCH MG: 324 (65 FE) TABLET DELAYED RESPONSE at 07:06

## 2017-08-30 RX ADMIN — FERROUS SULFATE TAB EC 324 MG (65 MG FE EQUIVALENT) SCH MG: 324 (65 FE) TABLET DELAYED RESPONSE at 16:44

## 2017-08-30 NOTE — PN
Psychiatric Progress Note


Vital Signs: 


 Vital Signs











 Period  Temp  Pulse  Resp  BP Sys/Camara  Pulse Ox


 


 Last 24 Hr  97.4 F  56-60  16-20  112-133/59-68  











Date of Session: 08/30/17


Chief Complaint:: Discharge visit


HPI: Patient addressed Alcohol dependence with no psychiatric comorbidity.


ROS: Significant for HTN.


Current Medications: 


Active Medications











Generic Name Dose Route Start Last Admin





  Trade Name Freq  PRN Reason Stop Dose Admin


 


Al Hydroxide/Mg Hydroxide  30 ml 08/21/17 15:28  





  Mylanta Oral Suspension -  PO   





  Q6H PRN   





  DYSPEPSIA   


 


Cyclobenzaprine HCl  10 mg 08/28/17 13:44 08/28/17 22:03





  Flexeril -  PO   10 mg





  TID PRN   Administration





  MUSCLE SPASMS   


 


Diphenhydramine HCl  50 mg 08/21/17 15:28 08/29/17 21:39





  Benadryl -  PO   50 mg





  HSMR1 PRN   Administration





  FOR ITCHING   


 


Docusate Sodium  300 mg 08/30/17 22:00  





  Colace -  PO   





  HS MARIANNE   


 


Eucalyptus/Menthol/Phenol/Sorbitol  1 each 08/21/17 15:28  





  Cepastat Lozenge -  MM   





  Q4H PRN   





  SORE THROAT   


 


Ferrous Sulfate  325 mg 08/21/17 17:30 08/30/17 13:00





  Feosol -  PO   325 mg





  TIDCM MARIANNE   Administration


 


Fluocinonide  1 applic 08/24/17 13:00 08/30/17 09:51





  Lidex 0.05% Ointment -  TP   1 applic





  BID MARIANNE   Administration


 


Guaifenesin  10 ml 08/21/17 15:28  





  Robitussin Dm -  PO   





  Q6H PRN   





  COUGH   


 


Hydrocortisone  1 applic 08/24/17 13:00 08/30/17 09:51





  Anusol 2.5% Hc Cream -  TP   1 applic





  DAILY MARIANNE   Administration


 


Hydroxyzine Pamoate  25 mg 08/21/17 15:28 08/29/17 01:15





  Vistaril -  PO   25 mg





  Q4H PRN   Administration





  AGITATION   


 


Ibuprofen  400 mg 08/21/17 15:28 08/24/17 21:54





  Motrin -  PO   400 mg





  Q6H PRN   Administration





  PAIN   


 


Lactulose  20 gm 08/21/17 22:00 08/30/17 09:51





  Cephulac (Oral Use)  PO   20 gm





  BID MARIANNE   Administration


 


Loperamide HCl  4 mg 08/21/17 15:28  





  Imodium -  PO   





  Q6H PRN   





  DIARRHEA   


 


Magnesium Hydroxide  30 ml 08/21/17 15:28  





  Milk Of Magnesia -  PO   





  DAILY PRN   





  CONSTIPATION   


 


Metoprolol Succinate  100 mg 08/22/17 10:00 08/30/17 09:51





  Toprol Xl -  PO   100 mg





  DAILY MARIANNE   Administration


 


Prenatal Multivit/Folic Acid/Iron  1 tab 08/22/17 10:00 08/30/17 09:51





  Prenatal Vitamins (Sjr) -  PO   1 tab





  DAILY MARIANNE   Administration


 


Pseudoephedrine/Triprolidine  1 combo 08/21/17 15:28  





  Actifed -  PO   





  TID PRN   





  NASAL CONGESTION   


 


Thiamine HCl  100 mg 08/21/17 22:00 08/29/17 21:39





  Vitamin B1 -  PO   100 mg





  HS MARIANNE   Administration











Current Side Effect: No


Lab tests ordered: No


Lab tests reviewed: Yes


Provider note:: Patient will complete this proigram tomorrow 08/31/17.He has 

met his treatment goals and will continue to address his issues on outpatient 

basis at [facility] in the [place].Patient reports finding that current 

management helps  him to cope with his addiction.Patient identifies areas of 

disfficulties and ways,support system,coping skills he can utilize to maintain 

recovery.  Supportive therapy privided.  Patient is stable for discharge 

tomorrow 08/31/17.


Total face to face time:: 30





Mental Status Exam





- Mental Status Exam


Alert and Oriented to: Time, Place, Person


Cognitive Function: Grossly Intact


Patient Appearance: Well Groomed


Mood: Euthymic


Affect: Appropriate, Mood Congruent


Patient Behavior: Cooperative


Speech Pattern: Clear


Voice Loudness: Normal


Thought Process: Goal Oriented


Thought Disorder: Not Present


Hallucinations: Denies


Suicidal Ideation: Denies


Homicidal Ideation: Denies


Insight/Judgement: Fair


Sleep: Well


Appetite: Good


Muscle strength/Tone: Normal


Gait/Station: Normal





Psychiatric Treatment Plan





- Problem List


(1) Alcohol dependence


Current Visit: Yes   





(2) Contact dermatitis


Current Visit: Yes   





(3) Hemorrhoid


Current Visit: Yes   





(4) Hypertension


Current Visit: No   Qualifiers: 


   





(5) Nicotine dependence


Current Visit: No   Qualifiers:

## 2017-08-31 VITALS — SYSTOLIC BLOOD PRESSURE: 109 MMHG | DIASTOLIC BLOOD PRESSURE: 72 MMHG

## 2017-08-31 VITALS — TEMPERATURE: 97.7 F

## 2017-08-31 RX ADMIN — FERROUS SULFATE TAB EC 324 MG (65 MG FE EQUIVALENT) SCH MG: 324 (65 FE) TABLET DELAYED RESPONSE at 07:27

## 2017-08-31 RX ADMIN — FLUOCINONIDE SCH APPLIC: 0.5 OINTMENT TOPICAL at 10:30

## 2017-08-31 RX ADMIN — LACTULOSE SCH GM: 20 SOLUTION ORAL at 10:28

## 2017-08-31 RX ADMIN — METOPROLOL SUCCINATE SCH MG: 100 TABLET, EXTENDED RELEASE ORAL at 10:28

## 2017-08-31 RX ADMIN — Medication SCH TAB: at 10:28

## 2017-08-31 RX ADMIN — HYDROCORTISONE SCH APPLIC: 25 CREAM TOPICAL at 10:28

## 2018-02-06 ENCOUNTER — HOSPITAL ENCOUNTER (INPATIENT)
Dept: HOSPITAL 74 - YASAS | Age: 63
LOS: 5 days | Discharge: HOME | DRG: 897 | End: 2018-02-11
Attending: INTERNAL MEDICINE | Admitting: INTERNAL MEDICINE
Payer: COMMERCIAL

## 2018-02-06 VITALS — BODY MASS INDEX: 28.5 KG/M2

## 2018-02-06 DIAGNOSIS — I48.91: ICD-10-CM

## 2018-02-06 DIAGNOSIS — G47.00: ICD-10-CM

## 2018-02-06 DIAGNOSIS — F12.20: ICD-10-CM

## 2018-02-06 DIAGNOSIS — F32.9: ICD-10-CM

## 2018-02-06 DIAGNOSIS — B18.2: ICD-10-CM

## 2018-02-06 DIAGNOSIS — K64.0: ICD-10-CM

## 2018-02-06 DIAGNOSIS — Z96.641: ICD-10-CM

## 2018-02-06 DIAGNOSIS — R00.0: ICD-10-CM

## 2018-02-06 DIAGNOSIS — F17.213: ICD-10-CM

## 2018-02-06 DIAGNOSIS — I10: ICD-10-CM

## 2018-02-06 DIAGNOSIS — Z87.438: ICD-10-CM

## 2018-02-06 DIAGNOSIS — F10.230: Primary | ICD-10-CM

## 2018-02-06 PROCEDURE — HZ2ZZZZ DETOXIFICATION SERVICES FOR SUBSTANCE ABUSE TREATMENT: ICD-10-PCS | Performed by: INTERNAL MEDICINE

## 2018-02-06 RX ADMIN — Medication SCH MG: at 22:16

## 2018-02-06 NOTE — PN
[facility]HS Progress Note


Note: 





Patient currently stable, asymptomatic, no acute distress.  EKG: A-Fib v/s 105/

69 p80 r18 T98.3.


Consulted with Dr. Jha.


One stat dose Librium 50 mg


increase fluids 


repeat EKG

## 2018-02-06 NOTE — HP
CIWA Score





- CIWA Score


Nausea/Vomitin-Mild Nausea/No Vomiting


Muscle Tremors: 4-Moderate,w/Arms Extend


Anxiety: 4-Mod. Anxious/Guarded


Agitation: 4-Moderately Restless


Paroxysmal Sweats: 1-Minimal Palms Moist


Orientation: 3-Disoriented Date>2 days


Tacttile Disturbances: 1-Very Mild Itch/Numbness


Auditory Disturbances: 0-None


Visual Disturbances: 0-None


Headache: 0-None Present


CIWA-Ar Total Score: 18





Admission ROS S





- HPI


Chief Complaint: 





withdrawal sx


Allergies/Adverse Reactions: 


 Allergies











Allergy/AdvReac Type Severity Reaction Status Date / Time


 


No Known Allergies Allergy   Verified 17 14:30











History of Present Illness: 





62 years old male with long history of alcohol dependence has anemia and 

depression is admitted to detox


Exam Limitations: No Limitations





- Ebola screening


Have you traveled outside of the country in the last 21 days: No (N)


Have you had contact with anyone from an Ebola affected area: No


Have you been sick,other than usual withdrawal symptoms: No


Do you have a fever: No





- Review of Systems


Constitutional: Changes in sleep, Weight Stable


EENT: reports: Blurred Vision (need eye glasses)


Respiratory: reports: Productive cough


Cardiac: reports: No Symptoms Reported


GI: reports: Nausea, Poor Fluid Intake, Abdominal cramping


: reports: No Symptoms Reported


Musculoskeletal: reports: No Symptoms Reported


Integumentary: reports: No Symptoms Reported


Neuro: reports: Tremors


Endocrine: reports: No Symptoms Reported


Hematology: reports: No Symptoms Reported


Psychiatric: reports: Judgement Intact, Anxious, Depressed


Other Systems: Reviewed and Negative





Patient History





- Patient Medical History


Hx Anemia: Yes (in the past )


Hx Asthma: No


Hx Chronic Obstructive Pulmonary Disease (COPD): No


Hx Cancer: No


Hx Cardiac Disorders: No


Hx Congestive Heart Failure: No


Hx Hypertension: Yes (on Metoprolol)


Hx Hypercholesterolemia: No


Hx Pacemaker: No


HX Cerebrovascular Accident: No


Hx Seizures: No


Hx Dementia: No


Hx Diabetes: No


Hx Gastrointestinal Disorders: No


Hx Liver Disease: No


Hx Genitourinary Disorders: No


Hx Sexually Transmitted Disorders: No (clymadia/gonerrhea in the past )


Hx Renal Disease (ESRD): No


Hx Thyroid Disease: No


Hx Human Immunodeficiency Virus (HIV): No (negative)


Hx Hepatitis C: Yes


Hx Depression: Yes


Hx Suicide Attempt: No (denies)


Hx Bipolar Disorder: No


Hx Schizophrenia: No





- Patient Surgical History


Past Surgical History: Yes


Hx Neurologic Surgery: No


Hx Cataract Extraction: No


Hx Cardiac Surgery: No


Hx Lung Surgery: No


Hx Breast Surgery: No


Hx Breast Biopsy: No


Hx Abdominal Surgery: No


Hx Appendectomy: No


Hx Cholecystectomy: No


Hx Genitourinary Surgery: No


Hx Orthopedic Surgery: Yes (rt total hip replacement )


Anesthesia Reaction: No





- PPD History


Previous Implant?: Yes


Documented Results: Negative w/proof


Implanted On Prior [facility] Admission?: Yes


Date: 17


Results: 0 mm


PPD to be Administered?: No





- Smoking Cessation


Smoking history: Former smoker


Have you smoked in the past 12 months: No


Aproximately how many cigarettes per day: 0


Hx Chewing Tobacco Use: No


Initiated information on smoking cessation: No





- Substance & Tx. History


Hx Alcohol Use: Yes


Hx Substance Use: Yes


Substance Use Type: Alcohol, Marijuana


Hx Substance Use Treatment: Yes (2017 [facility]





- Substances Abused


  ** Alcohol


Route: Oral


Frequency: Daily


Amount used: [id number] beer


Age of first use: 11


Date of Last Use: 19





  ** Marijuana/Hashish


Route: Smoking


Frequency: Daily


Amount used: 4 joints


Age of first use: 16


Date of Last Use: 19





Family Disease History





- Family Disease History


Family Disease History: Diabetes: Father (), [place]: Brother (leukemia), 

Other: Father, Mother (no contact)





Admission Physical Exam [facility]





- Vital Signs


Vital Signs: 


 Vital Signs - 24 hr











  18





  14:28


 


Temperature 97.2 F L


 


Pulse Rate 90


 


Respiratory 20





Rate 


 


Blood Pressure 111/68














- Physical


General Appearance: Yes: Nourished, Appropriately Dressed, Moderate Distress, 

Alcohol on Breath, Tremorous, Irritable, Sweating, Anxious


HEENTM: Yes: Hearing grossly Normal, Normal ENT Inspection, Normocephalic, 

Normal Voice


Respiratory: Yes: Chest Non-Tender, Lungs Clear, Normal Breath Sounds, No 

Respiratory Distress, No Accessory Muscle Use


Neck: Yes: Supple, Trachea in good position


Cardiology: Yes: Regular Rhythm, S1, S2, Tachycardia


Abdominal: Yes: Normal Bowel Sounds, Non Tender, Soft


Genitourinary: Yes: Within Normal Limits


Back: Yes: Normal Inspection


Musculoskeletal: Yes: full range of Motion, Gait Steady


Extremities: Yes: Normal Inspection, Normal Range of Motion, Non-Tender, Tremors


Neurological: Yes: Alert, Motor Strength 5/5, Normal Response, Depressed Affect


Integumentary: Yes: Warm


Lymphatic: Yes: Within Normal Limits





- Diagnostic


(1) Alcohol dependence with uncomplicated withdrawal


Current Visit: Yes   Status: Acute   





(2) Hepatitis C


Current Visit: Yes   Status: Chronic   


Qualifiers: 


   Viral hepatitis chronicity: carrier   Qualified Code(s): B18.2 - Chronic 

viral hepatitis C   





(3) Hemorrhoid


Current Visit: Yes   Status: Chronic   


Qualifiers: 


   Hemorrhoid type: first degree   Qualified Code(s): K64.0 - First degree 

hemorrhoids   





(4) Hypertension


Current Visit: Yes   Status: Chronic   


Qualifiers: 


   Hypertension type: essential hypertension 





Cleared for Admission [facility]





- Detox or Rehab


[facility] Level of Care: Medically Managed


Detox Regimen/Protocol: Librium





[facility] Breath Alcohol Content


Breath Alcohol Content: 0.138





Urine Drug Screen





- Results


Drug Screen Negative: No


Urine Drug Screen Results: THC-Marijuana

## 2018-02-07 ENCOUNTER — HOSPITAL ENCOUNTER (EMERGENCY)
Dept: HOSPITAL 74 - JER | Age: 63
Discharge: HOME | End: 2018-02-07
Payer: COMMERCIAL

## 2018-02-07 VITALS — HEART RATE: 86 BPM | DIASTOLIC BLOOD PRESSURE: 83 MMHG | SYSTOLIC BLOOD PRESSURE: 115 MMHG

## 2018-02-07 VITALS — TEMPERATURE: 97.7 F

## 2018-02-07 VITALS — BODY MASS INDEX: 27.8 KG/M2

## 2018-02-07 DIAGNOSIS — I10: ICD-10-CM

## 2018-02-07 DIAGNOSIS — Z87.891: ICD-10-CM

## 2018-02-07 DIAGNOSIS — F10.20: ICD-10-CM

## 2018-02-07 DIAGNOSIS — B19.20: ICD-10-CM

## 2018-02-07 DIAGNOSIS — I48.91: Primary | ICD-10-CM

## 2018-02-07 LAB
ALBUMIN SERPL-MCNC: 2.2 G/DL (ref 3.4–5)
ALP SERPL-CCNC: 248 U/L (ref 45–117)
ALT SERPL-CCNC: 48 U/L (ref 12–78)
ANION GAP SERPL CALC-SCNC: 9 MMOL/L (ref 8–16)
AST SERPL-CCNC: 108 U/L (ref 15–37)
BILIRUB SERPL-MCNC: 4.3 MG/DL (ref 0.2–1)
BUN SERPL-MCNC: 10 MG/DL (ref 7–18)
CALCIUM SERPL-MCNC: 7.9 MG/DL (ref 8.5–10.1)
CHLORIDE SERPL-SCNC: 106 MMOL/L (ref 98–107)
CO2 SERPL-SCNC: 26 MMOL/L (ref 21–32)
CREAT SERPL-MCNC: 0.9 MG/DL (ref 0.7–1.3)
DEPRECATED RDW RBC AUTO: 17.4 % (ref 11.9–15.9)
GLUCOSE SERPL-MCNC: 101 MG/DL (ref 74–106)
HCT VFR BLD CALC: 34.5 % (ref 35.4–49)
HGB BLD-MCNC: 11.4 GM/DL (ref 11.7–16.9)
MCH RBC QN AUTO: 34.4 PG (ref 25.7–33.7)
MCHC RBC AUTO-ENTMCNC: 33 G/DL (ref 32–35.9)
MCV RBC: 104.3 FL (ref 80–96)
PLATELET # BLD AUTO: 33 K/MM3 (ref 134–434)
PMV BLD: 11.9 FL (ref 7.5–11.1)
POTASSIUM SERPLBLD-SCNC: 4 MMOL/L (ref 3.5–5.1)
PROT SERPL-MCNC: 7.4 G/DL (ref 6.4–8.2)
RBC # BLD AUTO: 3.3 M/MM3 (ref 4–5.6)
SODIUM SERPL-SCNC: 141 MMOL/L (ref 136–145)
WBC # BLD AUTO: 5.4 K/MM3 (ref 4–10)

## 2018-02-07 RX ADMIN — Medication SCH TAB: at 10:50

## 2018-02-07 RX ADMIN — ZOLPIDEM TARTRATE PRN MG: 5 TABLET, COATED ORAL at 22:16

## 2018-02-07 RX ADMIN — Medication SCH MG: at 22:16

## 2018-02-07 NOTE — EKG
Test Reason : 

Blood Pressure : ***/*** mmHG

Vent. Rate : 084 BPM     Atrial Rate : 090 BPM

   P-R Int : 000 ms          QRS Dur : 094 ms

    QT Int : 404 ms       P-R-T Axes : 000 050 045 degrees

   QTc Int : 477 ms

 

ATRIAL FIBRILLATION

ABNORMAL ECG

WHEN COMPARED WITH ECG OF 06-FEB-2018 21:14,

NO SIGNIFICANT CHANGE WAS FOUND

Confirmed by DUGLAS HUDSON MD ([id number]) on 2/7/2018 10:56:17 AM

 

Referred By:             Confirmed By:DUGLAS HUDSON MD

## 2018-02-07 NOTE — PN
[facility]HS Progress Note


Note: 





Patient was seen and examined at bedside. Patient has 3 abnormal EKG that 

indicates atrial fibrillation. His EKG on prior admission in August 2017 was 

normal sinus rhythm. He has medical history of HTN and is on metoprolol. He 

denies any other cardiac condition, use of antarrhythmic agent or blood 

thinners. Vital signs B/P 110/68, HR 80,. R 18, T96.3. Patient is being sent to 

ER for further evaluation. Endorsed to Dr. Santana.

## 2018-02-07 NOTE — PN
[facility] CIWA





- CIWA Score


Nausea/Vomitin-No Nausea/No Vomiting


Muscle Tremors: 4-Moderate,w/Arms Extend


Anxiety: 4-Mod. Anxious/Guarded


Agitation: 3


Paroxysmal Sweats: 1-Minimal Palms Moist


Orientation: 0-Oriented


Tacttile Disturbances: 3-Moderate Itch/Numb/Burn


Auditory Disturbances: 0-None


Visual Disturbances: 0-None


Headache: 0-None Present


CIWA-Ar Total Score: 15





BHS Progress Note (SOAP)


Subjective: 





PT RETURNED FROM ER IN STABLE CONDITION. ALERT O X 3. OOB AMBULATING WITH 

STEADY GAIT. DENIES CHEST PAIN. SOB OR DIZZINESS.  


Objective: 





18 12:35


 Vital Signs











  18





  06:15 07:34 10:50


 


Temperature 98 F 96.3 F L 97.7 F


 


Pulse Rate 88 80 71


 


Respiratory 18 18 18





Rate   


 


Blood Pressure 118/69 110/68 107/66








 Laboratory Last Values











WBC  5.4 K/mm3 (4.0-10.0)  D 18  06:00    


 


RBC  3.30 M/mm3 (4.00-5.60)  L  18  06:00    


 


Hgb  11.4 GM/dL (11.7-16.9)  L D 18  06:00    


 


Hct  34.5 % (35.4-49)  L D 18  06:00    


 


MCV  104.3 fl (80-96)  H  18  06:00    


 


MCH  34.4 pg (25.7-33.7)  H  18  06:00    


 


MCHC  33.0 g/dl (32.0-35.9)   18  06:00    


 


RDW  17.4 % (11.9-15.9)  H  18  06:00    


 


Plt Count  33 K/MM3 (134-434)  L* D 18  06:00    


 


MPV  11.9 fl (7.5-11.1)  H  18  06:00    


 


Sodium  141 mmol/L (136-145)   18  06:00    


 


Potassium  4.0 mmol/L (3.5-5.1)   18  06:00    


 


Chloride  106 mmol/L ()   18  06:00    


 


Carbon Dioxide  26 mmol/L (21-32)   18  06:00    


 


Anion Gap  9  (8-16)   18  06:00    


 


BUN  10 mg/dL (7-18)   18  06:00    


 


Creatinine  0.9 mg/dL (0.7-1.3)   18  06:00    


 


Creat Clearance w eGFR  > 60  (>60)   18  06:00    


 


Random Glucose  101 mg/dL ()   18  06:00    


 


Calcium  7.9 mg/dL (8.5-10.1)  L  18  06:00    


 


Total Bilirubin  4.3 mg/dL (0.2-1.0)  H  18  06:00    


 


AST  108 U/L (15-37)  H  18  06:00    


 


ALT  48 U/L (12-78)   18  06:00    


 


Alkaline Phosphatase  248 U/L ()  H  18  06:00    


 


Total Protein  7.4 g/dl (6.4-8.2)   18  06:00    


 


Albumin  2.2 g/dl (3.4-5.0)  L  18  06:00    








 Laboratory Results - last 24 hr











  18





  06:00 06:00


 


WBC  5.4  D 


 


RBC  3.30 L 


 


Hgb  11.4 L D 


 


Hct  34.5 L D 


 


MCV  104.3 H 


 


MCH  34.4 H 


 


MCHC  33.0 


 


RDW  17.4 H 


 


Plt Count  33 L* D 


 


MPV  11.9 H 


 


Sodium   141


 


Potassium   4.0


 


Chloride   106


 


Carbon Dioxide   26


 


Anion Gap   9


 


BUN   10


 


Creatinine   0.9


 


Creat Clearance w eGFR   > 60


 


Random Glucose   101


 


Calcium   7.9 L


 


Total Bilirubin   4.3 H


 


AST   108 H


 


ALT   48


 


Alkaline Phosphatase   248 H


 


Total Protein   7.4


 


Albumin   2.2 L











Assessment: 





18 12:36


WITHDRAWAL SX


Plan: 





CONTINUE DETOX

## 2018-02-07 NOTE — CONSULT
[facility] Psychiatric Consult





- Data


Date of interview: 02/07/18


Admission source: [facility]


Identifying data: Readmission to [facility] for this 61 y/o  male 

seeking detox treatment [address] for alcohol and marihuana dependence.Patient 

is in a long standing common-law relationship,father of seven,domiciled and 

currently retired (supported on his pension benefits).


Substance Abuse History: Patient admits to daily use of alcohol and 

marihuana.Details in current [facility] report.Smoking history: Former smoker.  Have 

you smoked in the past 12 months: No.  Aproximately how many cigarettes per day

: 0.  Hx Chewing Tobacco Use: No.  Initiated information on smoking cessation: 

No.  - Substance & Tx. History.  Hx Alcohol Use: Yes.  Hx Substance Use: Yes.  

Substance Use Type: Alcohol, Marijuana.  Hx Substance Use Treatment: Yes (8/ 2017 [facility]).  - Substances Abused.  ** Alcohol.  Route: Oral.  Frequency: 

Daily.  Amount used: [id number] beer.  Age of first use: 11.  Date of Last Use: 02/ 06/18.  ** Marijuana/Hashish.  Route: Smoking.  Frequency: Daily.  Amount used: 

4 joints.  Age of first use: 16.  Date of Last Use: 02/06/18


Medical History: Hepatitis C,history of anemia,past treatment for chlamydia/

gonorrhea and total right hip replacement (2001).


Psychiatric History: Patient denies.


Physical/Sexual Abuse/Trauma History: Patient denies.


Additional Comment: Urine Drug Screen Results: THC-Marijuana.Noted.





Mental Status Exam





- Mental Status Exam


Alert and Oriented to: Time, Place, Person


Cognitive Function: Good


Patient Appearance: Well Groomed


Mood: Hopeful, Euthymic


Affect: Appropriate, Normal Range


Patient Behavior: Fatigued, Appropriate, Cooperative


Speech Pattern: Clear, Appropriate


Voice Loudness: Normal


Thought Process: Intact, Goal Oriented


Thought Disorder: Not Present


Hallucinations: Denies


Suicidal Ideation: Denies


Homicidal Ideation: Denies


Insight/Judgement: Poor


Sleep: Poorly, Difficulty falling asleep


Appetite: Good


Muscle strength/Tone: Normal


Gait/Station: Normal





Psychiatric Findings





- Problem List (Axis 1, 2,3)


(1) Alcohol dependence with uncomplicated withdrawal


Current Visit: Yes   Status: Acute   





(2) Marijuana dependence


Current Visit: Yes   Status: Acute   





(3) Nicotine dependence


Current Visit: Yes   Status: Acute   


Qualifiers: 


   Nicotine product type: cigarettes   Substance use status: in withdrawal   

Qualified Code(s): F17.213 - Nicotine dependence, cigarettes, with withdrawal   





(4) Insomnia


Current Visit: Yes   Status: Acute   





- Initial Treatment Plan


Initial Treatment Plan: Psychoeducation and support.Detoxification 

initiated.Sleep hygiene discussed.Ambien 5 mg po hs prn.Patient is made aware 

of risk of parasomnias.He expressed his agreement to this careplan.Observation.

## 2018-02-07 NOTE — EKG
Test Reason : 

Blood Pressure : ***/*** mmHG

Vent. Rate : 085 BPM     Atrial Rate : 416 BPM

   P-R Int : 000 ms          QRS Dur : 094 ms

    QT Int : 396 ms       P-R-T Axes : 000 019 035 degrees

   QTc Int : 471 ms

 

ATRIAL FIBRILLATION

ABNORMAL ECG

WHEN COMPARED WITH ECG OF 07-FEB-2018 06:35,

NO SIGNIFICANT CHANGE WAS FOUND

Confirmed by DUGLAS HUDSON MD ([id number]) on 2/7/2018 3:47:20 PM

 

Referred By:             Confirmed By:DUGLAS HUDSON MD

## 2018-02-07 NOTE — EKG
Test Reason : 

Blood Pressure : ***/*** mmHG

Vent. Rate : 094 BPM     Atrial Rate : 091 BPM

   P-R Int : 000 ms          QRS Dur : 092 ms

    QT Int : 376 ms       P-R-T Axes : 000 048 061 degrees

   QTc Int : 470 ms

 

ATRIAL FIBRILLATION

ABNORMAL ECG

WHEN COMPARED WITH ECG OF 06-FEB-2018 19:00,

NO SIGNIFICANT CHANGE WAS FOUND

Confirmed by DUGLAS HUDSON MD ([id number]) on 2/7/2018 10:54:38 AM

 

Referred By:             Confirmed By:DUGLAS HUDSON MD

## 2018-02-07 NOTE — EKG
Test Reason : 

Blood Pressure : ***/*** mmHG

Vent. Rate : 091 BPM     Atrial Rate : 277 BPM

   P-R Int : 000 ms          QRS Dur : 090 ms

    QT Int : 364 ms       P-R-T Axes : 000 059 053 degrees

   QTc Int : 447 ms

 

ATRIAL FIBRILLATION

ABNORMAL ECG

WHEN COMPARED WITH ECG OF 17-AUG-2017 17:30,

ATRIAL FIBRILLATION HAS REPLACED SINUS RHYTHM

T WAVE AMPLITUDE HAS DECREASED IN ANTEROLATERAL LEADS

Confirmed by BECCA GONZALES, DUGLAS ([id number]) on 2/7/2018 10:54:26 AM

 

Referred By:             Confirmed By:DUGLAS HUDSON MD

## 2018-02-07 NOTE — PDOC
History of Present Illness





- General


History Source: Patient


Exam Limitations: No Limitations





- History of Present Illness


Initial Comments: 





02/07/18 09:33


Patient is a 62 year old male, from [facility], with a significant past medical 

history of Alcohol dependence, and Afib who was brought by EMS to the ED s/p 

Afib that began 30 mints prior to ED arrival.  As per [facility] staff, patient 

began to exhibit signs of Afib, and needed to be brought into the ED for 

further evaluation.  Patient reports being in [facility] for alcohol detox, 

stating his last drink was superbowl sunday.  He reports being on metoprolol 

but states he is unsure as to when was the last time he took it.  Patient 

currently has no injuries or complaints in the ED and requests to return to 

[facility] to resume his detox. 





Denies chest pain, SOB. Denies nausea, vomiting. Denies fevers, chills. Denies 

contact with sick individuals, out of state traveling. Denies any other 

symptoms. 





Allergies: None


Social history: Former smoker.. Former drinker. No illicit drugs. 


Surgical history: None


PMD: None








<Manjeet Cruz - Last Filed: 02/07/18 09:35>





<Sarah Santana - Last Filed: 02/07/18 09:37>





- General


Chief Complaint: Irregular Heart Beat


Stated Complaint: POSSIBLE AFIB


Time Seen by Provider: 02/07/18 08:55





Past History





<Manjeet Cruz - Last Filed: 02/07/18 09:35>





- Past Medical History


Anemia: Yes (in the past )


Asthma: No


Cancer: No


Cardiac Disorders: Yes (Afib)


CVA: No


COPD: No


CHF: No


Dementia: No


Diabetes: No


GI Disorders: No


 Disorders: No


HTN: Yes


Hypercholesterolemia: No


Kidney Stones: No


Liver Disease: Yes (hep C)


Seizures: No


Thyroid Disease: No





- Surgical History


Abdominal Surgery: No


Appendectomy: No


Cardiac Surgery: No


Cholecystectomy: No


Lung Surgery: No


Neurologic Surgery: No


Orthopedic Surgery: Yes (rt total hip replacement 2001)





- Reproductive History


Testicular Surgery: No





- Suicide/Smoking/Psychosocial Hx


Smoking History: Former smoker


Have you smoked in the past 12 months: No


Number of Cigarettes Smoked Daily: 0


If you are a former smoker, when did you quit?: 15 yrs. ago


Information on smoking cessation initiated: No


'Breaking Loose' booklet given: 08/17/17


Hx Alcohol Use: Yes (in rehab at present time)


Drug/Substance Use Hx: No


Substance Use Type: Alcohol, Marijuana


Hx Substance Use Treatment: Yes





<Sarah Santana - Last Filed: 02/07/18 09:37>





- Past Medical History


Allergies/Adverse Reactions: 


 Allergies











Allergy/AdvReac Type Severity Reaction Status Date / Time


 


No Known Allergies Allergy   Verified 02/07/18 08:48











Home Medications: 


Ambulatory Orders





Metoprolol Succinate [Toprol XL -] 100 mg PO DAILY 08/17/17 











**Review of Systems





- Review of Systems


Able to Perform ROS?: Yes


Comments:: 





02/07/18 09:33


GENERAL/CONSTITUTIONAL: No fever or chills. No weakness.


HEAD, EYES, EARS, NOSE AND THROAT: No change in vision. No ear pain or 

discharge. No sore throat.


GASTROINTESTINAL: No nausea, vomiting, diarrhea or constipation.


GENITOURINARY: No dysuria, frequency, or change in urination.


CARDIOVASCULAR: No chest pain or shortness of breath.


RESPIRATORY: No cough, wheezing, or hemoptysis.


MUSCULOSKELETAL: No joint or muscle swelling or pain. No neck or back pain.


SKIN: No rash


NEUROLOGIC: No headache, vertigo, loss of consciousness, or change in strength/

sensation.


ENDOCRINE: No increased thirst. No abnormal weight change.


HEMATOLOGIC/LYMPHATIC: No anemia, easy bleeding, or history of blood clots.


ALLERGIC/IMMUNOLOGIC: No hives or skin allergy.





All Other Systems: Reviewed and Negative





<Manjeet Cruz - Last Filed: 02/07/18 09:35>





*Physical Exam





- Vital Signs


 Last Vital Signs











Temp Pulse Resp BP Pulse Ox


 


 97.7 F   86   20   115/83   100 


 


 02/07/18 08:48  02/07/18 09:24  02/07/18 09:24  02/07/18 09:24  02/07/18 09:24














- Physical Exam


Comments: 





02/07/18 09:34


GENERAL: Awake, alert, and fully oriented, in no acute distress


HEAD: No signs of trauma


EYES: PERRLA, EOMI, sclera anicteric, conjunctiva clear


ENT: Auricles normal inspection, hearing grossly normal, nares patent, 

oropharynx clear without exudates. Moist mucosa


NECK: Normal ROM, supple, no lymphadenopathy, JVD, or masses


LUNGS: Breath sounds equal, clear to auscultation bilaterally.  No wheezes, and 

no crackles


HEART: +Irregularly irregular. 


Regular rate and rhythm, normal S1 and S2, no murmurs, rubs or gallops


ABDOMEN: Soft, nontender, normoactive bowel sounds.  No guarding, no rebound.  

No masses


EXTREMITIES: +Trace edema in lower extremities bilaterally. 


Normal range of motion,.  No clubbing or cyanosis. No cords, erythema, or 

tenderness


NEUROLOGICAL: Cranial nerves II through XII grossly intact.  Normal speech, 

normal gait


SKIN: Warm, Dry, normal turgor, no rashes or lesions noted.








<Manjeet Cruz - Last Filed: 02/07/18 09:35>





- Vital Signs


 Last Vital Signs











Temp Pulse Resp BP Pulse Ox


 


 97.7 F   88   18   117/78   100 


 


 02/07/18 08:48  02/07/18 08:48  02/07/18 08:48  02/07/18 08:48  02/07/18 08:48














<Sarah Santana - Last Filed: 02/07/18 09:37>





**Heart Score/ECG Review





- ECG Intrepretation


Comment:: 





02/07/18 09:19


afib at 85, nl axis, nl interval, no acute st/t wave findings





<Sarah Santana - Last Filed: 02/07/18 09:37>





ED Treatment Course





- Medications


Given in the ED: 


ED Medications














Discontinued Medications














Generic Name Dose Route Start Last Admin





  Trade Name Freq  PRN Reason Stop Dose Admin


 


Metoprolol Tartrate  50 mg  02/07/18 09:13  02/07/18 09:25





  Lopressor -  PO  02/07/18 09:14  50 mg





  ONCE ONE   Administration














<Manjeet Cruz - Last Filed: 02/07/18 09:35>





Medical Decision Making





- Medical Decision Making





02/07/18 09:17


a/p: 61yo male sent from [facility] for eval of afib


pt states hx of afib


no cp/sob/palpitations


afib w rate control at this time. takes metoprolol


has not had metoprolol in a few days


will dose metoprolol in the ED. 


call placed to Dr. Oropeza to return to the patient to [facility]


pt requesting to go back to detox


02/07/18 09:35


case discussed with dr. oropeza who accept the patient back to [facility] for 

detox





<Sarah Santana - Last Filed: 02/07/18 09:37>





*DC/Admit/Observation/Transfer





- Attestations


Scribe Attestion: 





02/07/18 09:34





Documentation prepared by Manjeet Cruz, acting as medical scribe for Sarah Santana DO, MD/.





<Manjeet Cruz - Last Filed: 02/07/18 09:35>





- Discharge Dispostion


Admit: No





- Attestations


Physician Attestion: 





02/07/18 09:19








I, Dr. Sarah Santana DO, attest that this document has been prepared under 

my direction and personally reviewed by me in its entirety.   I further attest, 

that it accurately reflects all work, treatment, procedures and medical decision

-making performed by me.  





<Sarah Santana - Last Filed: 02/07/18 09:37>


Diagnosis at time of Disposition: 


 Atrial fibrillation








- Discharge Dispostion


Disposition: HOME


Condition at time of disposition: Stable





- Referrals


Referrals: 


Troy Colvin MD [Staff Physician] - 





- Patient Instructions


Printed Discharge Instructions:  DI for Arrhythmias


Additional Instructions: 


Please return to [facility] to continue detox. Please make an appointment to 

follow up with the cardiologist for further eval of your atrial fibrillation.

## 2018-02-07 NOTE — PN
[facility] Progress Note


Note: 





Called by Ed, patiet assessed, rate controlled, h/o Af, will accept back to 

detox continue metoproll at 50mg (ususal dose 100mg) because of libirum, can 

increase if bp raised or rat increases.  rate controlled af - old.  this does 

not need further evaluation in Ed.

## 2018-02-08 LAB
ALBUMIN SERPL-MCNC: 1.8 G/DL (ref 3.4–5)
ALP SERPL-CCNC: 219 U/L (ref 45–117)
ALT SERPL-CCNC: 39 U/L (ref 12–78)
ANION GAP SERPL CALC-SCNC: 7 MMOL/L (ref 8–16)
AST SERPL-CCNC: 83 U/L (ref 15–37)
BILIRUB SERPL-MCNC: 4.3 MG/DL (ref 0.2–1)
BUN SERPL-MCNC: 12 MG/DL (ref 7–18)
CALCIUM SERPL-MCNC: 8.5 MG/DL (ref 8.5–10.1)
CHLORIDE SERPL-SCNC: 104 MMOL/L (ref 98–107)
CO2 SERPL-SCNC: 28 MMOL/L (ref 21–32)
CREAT SERPL-MCNC: 0.8 MG/DL (ref 0.7–1.3)
DEPRECATED RDW RBC AUTO: 17.8 % (ref 11.9–15.9)
GLUCOSE SERPL-MCNC: 94 MG/DL (ref 74–106)
HCT VFR BLD CALC: 35.3 % (ref 35.4–49)
HGB BLD-MCNC: 11.7 GM/DL (ref 11.7–16.9)
MCH RBC QN AUTO: 34.9 PG (ref 25.7–33.7)
MCHC RBC AUTO-ENTMCNC: 33.1 G/DL (ref 32–35.9)
MCV RBC: 105.3 FL (ref 80–96)
PLATELET # BLD AUTO: 35 K/MM3 (ref 134–434)
PMV BLD: 10.8 FL (ref 7.5–11.1)
POTASSIUM SERPLBLD-SCNC: 3.9 MMOL/L (ref 3.5–5.1)
PROT SERPL-MCNC: 6.7 G/DL (ref 6.4–8.2)
RBC # BLD AUTO: 3.35 M/MM3 (ref 4–5.6)
SODIUM SERPL-SCNC: 139 MMOL/L (ref 136–145)
WBC # BLD AUTO: 4.8 K/MM3 (ref 4–10)

## 2018-02-08 RX ADMIN — Medication SCH MG: at 22:27

## 2018-02-08 RX ADMIN — ZOLPIDEM TARTRATE PRN MG: 5 TABLET, COATED ORAL at 22:28

## 2018-02-08 RX ADMIN — Medication SCH TAB: at 10:05

## 2018-02-08 NOTE — PN
[facility] CIWA





- CIWA Score


Nausea/Vomitin-No Nausea/No Vomiting


Muscle Tremors: 3


Anxiety: 4-Mod. Anxious/Guarded


Agitation: 3


Paroxysmal Sweats: 1-Minimal Palms Moist


Orientation: 0-Oriented


Tacttile Disturbances: 3-Moderate Itch/Numb/Burn


Auditory Disturbances: 0-None


Visual Disturbances: 0-None


Headache: 0-None Present


CIWA-Ar Total Score: 14





[facility] Progress Note (SOAP)


Subjective: 





SLIGHT TREMORS AND ANXIETY. OOB AROUND THE UNIT. DENIES ANY CP OR DIZZINESS. PT 

REPORTS HE HAS APPOINTMENT WITH HIS "LIVER DOCTOR" ON . 

ENCOURAGED PT TO REMAIN SOBER  AFTER DETOX AND F/U WITH HIS MEDICAL CARE. 


Objective: 





18 10:35


 Vital Signs











Temperature  96.0 F L  18 09:26


 


Pulse Rate  97 H  18 09:26


 


Respiratory Rate  19   18 09:26


 


Blood Pressure  114/70   18 09:26


 


O2 Sat by Pulse Oximetry (%)      








 Laboratory Last Values











WBC  4.8 K/mm3 (4.0-10.0)   18  07:30    


 


RBC  3.35 M/mm3 (4.00-5.60)  L  18  07:30    


 


Hgb  11.7 GM/dL (11.7-16.9)   18  07:30    


 


Hct  35.3 % (35.4-49)  L  18  07:30    


 


MCV  105.3 fl (80-96)  H  18  07:30    


 


MCH  34.9 pg (25.7-33.7)  H  18  07:30    


 


MCHC  33.1 g/dl (32.0-35.9)   18  07:30    


 


RDW  17.8 % (11.9-15.9)  H  18  07:30    


 


Plt Count  35 K/MM3 (134-434)  L*  18  07:30    


 


MPV  10.8 fl (7.5-11.1)   18  07:30    


 


Neutrophils %  No Result Required.   18  07:30    


 


Lymphocytes %  No Result Required.   18  07:30    


 


Sodium  139 mmol/L (136-145)   18  07:30    


 


Potassium  3.9 mmol/L (3.5-5.1)   18  07:30    


 


Chloride  104 mmol/L ()   18  07:30    


 


Carbon Dioxide  28 mmol/L (21-32)   18  07:30    


 


Anion Gap  7  (8-16)  L  18  07:30    


 


BUN  12 mg/dL (7-18)   18  07:30    


 


Creatinine  0.8 mg/dL (0.7-1.3)   18  07:30    


 


Creat Clearance w eGFR  > 60  (>60)   18  07:30    


 


Random Glucose  94 mg/dL ()   18  07:30    


 


Calcium  8.5 mg/dL (8.5-10.1)   18  07:30    


 


Total Bilirubin  4.3 mg/dL (0.2-1.0)  H  18  07:30    


 


AST  83 U/L (15-37)  H D 18  07:30    


 


ALT  39 U/L (12-78)   18  07:30    


 


Alkaline Phosphatase  219 U/L ()  H  18  07:30    


 


Total Protein  6.7 g/dl (6.4-8.2)   18  07:30    


 


Albumin  1.8 g/dl (3.4-5.0)  L  18  07:30    


 


RPR Titer  Nonreactive  (NONREACTIVE)   18  06:00    


 


HIV 1&2 Antibody Screen  Negative   18  06:00    


 


HIV P24 Antigen  Negative   18  06:00    








 Laboratory Tests











  18





  06:00 06:00 06:00


 


WBC  5.4  D  


 


RBC  3.30 L  


 


Hgb  11.4 L D  


 


Hct  34.5 L D  


 


MCV  104.3 H  


 


MCH  34.4 H  


 


MCHC  33.0  


 


RDW  17.4 H  


 


Plt Count  33 L* D  


 


MPV  11.9 H  


 


Neutrophils %   


 


Lymphocytes %   


 


Sodium   141 


 


Potassium   4.0 


 


Chloride   106 


 


Carbon Dioxide   26 


 


Anion Gap   9 


 


BUN   10 


 


Creatinine   0.9 


 


Creat Clearance w eGFR   > 60 


 


Random Glucose   101 


 


Calcium   7.9 L 


 


Total Bilirubin   4.3 H 


 


AST   108 H 


 


ALT   48 


 


Alkaline Phosphatase   248 H 


 


Total Protein   7.4 


 


Albumin   2.2 L 


 


RPR Titer    Nonreactive


 


HIV 1&2 Antibody Screen   


 


HIV P24 Antigen   














  18





  06:00 07:30 07:30


 


WBC   4.8 


 


RBC   3.35 L 


 


Hgb   11.7 


 


Hct   35.3 L 


 


MCV   105.3 H 


 


MCH   34.9 H 


 


MCHC   33.1 


 


RDW   17.8 H 


 


Plt Count   35 L* 


 


MPV   10.8 


 


Neutrophils %   No Result Required. 


 


Lymphocytes %   No Result Required. 


 


Sodium    139


 


Potassium    3.9


 


Chloride    104


 


Carbon Dioxide    28


 


Anion Gap    7 L


 


BUN    12


 


Creatinine    0.8


 


Creat Clearance w eGFR    > 60


 


Random Glucose    94


 


Calcium    8.5


 


Total Bilirubin    4.3 H


 


AST    83 H D


 


ALT    39


 


Alkaline Phosphatase    219 H


 


Total Protein    6.7


 


Albumin    1.8 L


 


RPR Titer   


 


HIV 1&2 Antibody Screen  Negative  


 


HIV P24 Antigen  Negative  











Assessment: 





18 10:36


WITHDRAWAL SX


Plan: 





CONTINUE DETOX

## 2018-02-09 LAB
APPEARANCE UR: CLEAR
BILIRUB UR STRIP.AUTO-MCNC: NEGATIVE MG/DL
COLOR UR: (no result)
KETONES UR QL STRIP: NEGATIVE
LEUKOCYTE ESTERASE UR QL STRIP.AUTO: NEGATIVE
NITRITE UR QL STRIP: NEGATIVE
PH UR: 6 [PH] (ref 5–8)
PROT UR QL STRIP: NEGATIVE
PROT UR QL STRIP: NEGATIVE
RBC # UR STRIP: NEGATIVE /UL
SP GR UR: 1.01 (ref 1–1.03)
UROBILINOGEN UR STRIP-MCNC: (no result) MG/DL (ref 0.2–1)

## 2018-02-09 RX ADMIN — Medication SCH TAB: at 10:05

## 2018-02-09 RX ADMIN — ZOLPIDEM TARTRATE PRN MG: 5 TABLET, COATED ORAL at 22:21

## 2018-02-09 RX ADMIN — Medication SCH MG: at 22:22

## 2018-02-09 RX ADMIN — Medication PRN APPLIC: at 11:32

## 2018-02-09 RX ADMIN — Medication PRN APPLIC: at 22:24

## 2018-02-09 NOTE — PN
[facility] Progress Note (SOAP)


Subjective: 





OOB AMBULATING. SLIGHT ANXIETY AND TIREDNESS.


Objective: 





02/09/18 10:34


 Laboratory Last Values











WBC  4.8 K/mm3 (4.0-10.0)   02/08/18  07:30    


 


RBC  3.35 M/mm3 (4.00-5.60)  L  02/08/18  07:30    


 


Hgb  11.7 GM/dL (11.7-16.9)   02/08/18  07:30    


 


Hct  35.3 % (35.4-49)  L  02/08/18  07:30    


 


MCV  105.3 fl (80-96)  H  02/08/18  07:30    


 


MCH  34.9 pg (25.7-33.7)  H  02/08/18  07:30    


 


MCHC  33.1 g/dl (32.0-35.9)   02/08/18  07:30    


 


RDW  17.8 % (11.9-15.9)  H  02/08/18  07:30    


 


Plt Count  35 K/MM3 (134-434)  L*  02/08/18  07:30    


 


MPV  10.8 fl (7.5-11.1)   02/08/18  07:30    


 


Total Counted  100   02/08/18  07:30    


 


Neutrophils %  No Result Required.   02/08/18  07:30    


 


Neutrophils % (Manual)  58.0 % (42.8-82.8)   02/08/18  07:30    


 


Band Neutrophils %  1.0 %  02/08/18  07:30    


 


Lymphocytes %  No Result Required.   02/08/18  07:30    


 


Lymphocytes % (Manual)  31.0 % (8-40)  D 02/08/18  07:30    


 


Monocytes % (Manual)  8 % (3.8-10.2)   02/08/18  07:30    


 


Eosinophils % (Manual)  2.0 % (0-4.5)   02/08/18  07:30    


 


Sodium  139 mmol/L (136-145)   02/08/18  07:30    


 


Potassium  3.9 mmol/L (3.5-5.1)   02/08/18  07:30    


 


Chloride  104 mmol/L ()   02/08/18  07:30    


 


Carbon Dioxide  28 mmol/L (21-32)   02/08/18  07:30    


 


Anion Gap  7  (8-16)  L  02/08/18  07:30    


 


BUN  12 mg/dL (7-18)   02/08/18  07:30    


 


Creatinine  0.8 mg/dL (0.7-1.3)   02/08/18  07:30    


 


Creat Clearance w eGFR  > 60  (>60)   02/08/18  07:30    


 


Random Glucose  94 mg/dL ()   02/08/18  07:30    


 


Calcium  8.5 mg/dL (8.5-10.1)   02/08/18  07:30    


 


Total Bilirubin  4.3 mg/dL (0.2-1.0)  H  02/08/18  07:30    


 


AST  83 U/L (15-37)  H D 02/08/18  07:30    


 


ALT  39 U/L (12-78)   02/08/18  07:30    


 


Alkaline Phosphatase  219 U/L ()  H  02/08/18  07:30    


 


Total Protein  6.7 g/dl (6.4-8.2)   02/08/18  07:30    


 


Albumin  1.8 g/dl (3.4-5.0)  L  02/08/18  07:30    


 


RPR Titer  Nonreactive  (NONREACTIVE)   02/07/18  06:00    


 


HIV 1&2 Antibody Screen  Negative   02/07/18  06:00    


 


HIV P24 Antigen  Negative   02/07/18  06:00    











Assessment: 





02/09/18 10:34


WITHDRAWAL SX


Plan: 





CONTINUE DETOX


INCREASE PO FLUIDS

## 2018-02-10 RX ADMIN — Medication SCH TAB: at 10:10

## 2018-02-10 RX ADMIN — Medication SCH MG: at 22:07

## 2018-02-10 NOTE — PN
[facility] Progress Note (SOAP)


Subjective: 





Sweating, interrupted sleep


Objective: 





02/10/18 15:49


 Last Vital Signs











Temp Pulse Resp BP Pulse Ox


 


 98.0 F   79   16   104/60    


 


 02/10/18 13:52  02/10/18 13:52  02/10/18 13:52  02/10/18 13:52   








 Laboratory Tests











  02/07/18 02/07/18 02/07/18





  06:00 06:00 06:00


 


WBC  5.4  D  


 


RBC  3.30 L  


 


Hgb  11.4 L D  


 


Hct  34.5 L D  


 


MCV  104.3 H  


 


MCH  34.4 H  


 


MCHC  33.0  


 


RDW  17.4 H  


 


Plt Count  33 L* D  


 


MPV  11.9 H  


 


Total Counted   


 


Neutrophils %   


 


Neutrophils % (Manual)   


 


Band Neutrophils %   


 


Lymphocytes %   


 


Lymphocytes % (Manual)   


 


Monocytes % (Manual)   


 


Eosinophils % (Manual)   


 


Sodium   141 


 


Potassium   4.0 


 


Chloride   106 


 


Carbon Dioxide   26 


 


Anion Gap   9 


 


BUN   10 


 


Creatinine   0.9 


 


Creat Clearance w eGFR   > 60 


 


Random Glucose   101 


 


Calcium   7.9 L 


 


Total Bilirubin   4.3 H 


 


AST   108 H 


 


ALT   48 


 


Alkaline Phosphatase   248 H 


 


Total Protein   7.4 


 


Albumin   2.2 L 


 


Urine Color   


 


Urine Appearance   


 


Urine pH   


 


Ur Specific Gravity   


 


Urine Protein   


 


Urine Glucose (UA)   


 


Urine Ketones   


 


Urine Blood   


 


Urine Nitrite   


 


Urine Bilirubin   


 


Urine Urobilinogen   


 


Ur Leukocyte Esterase   


 


RPR Titer    Nonreactive


 


HIV 1&2 Antibody Screen   


 


HIV P24 Antigen   














  02/07/18 02/08/18 02/08/18





  06:00 07:30 07:30


 


WBC   4.8 


 


RBC   3.35 L 


 


Hgb   11.7 


 


Hct   35.3 L 


 


MCV   105.3 H 


 


MCH   34.9 H 


 


MCHC   33.1 


 


RDW   17.8 H 


 


Plt Count   35 L* 


 


MPV   10.8 


 


Total Counted   100 


 


Neutrophils %   No Result Required. 


 


Neutrophils % (Manual)   58.0 


 


Band Neutrophils %   1.0 


 


Lymphocytes %   No Result Required. 


 


Lymphocytes % (Manual)   31.0  D 


 


Monocytes % (Manual)   8 


 


Eosinophils % (Manual)   2.0 


 


Sodium    139


 


Potassium    3.9


 


Chloride    104


 


Carbon Dioxide    28


 


Anion Gap    7 L


 


BUN    12


 


Creatinine    0.8


 


Creat Clearance w eGFR    > 60


 


Random Glucose    94


 


Calcium    8.5


 


Total Bilirubin    4.3 H


 


AST    83 H D


 


ALT    39


 


Alkaline Phosphatase    219 H


 


Total Protein    6.7


 


Albumin    1.8 L


 


Urine Color   


 


Urine Appearance   


 


Urine pH   


 


Ur Specific Gravity   


 


Urine Protein   


 


Urine Glucose (UA)   


 


Urine Ketones   


 


Urine Blood   


 


Urine Nitrite   


 


Urine Bilirubin   


 


Urine Urobilinogen   


 


Ur Leukocyte Esterase   


 


RPR Titer   


 


HIV 1&2 Antibody Screen  Negative  


 


HIV P24 Antigen  Negative  














  02/09/18





  11:50


 


WBC 


 


RBC 


 


Hgb 


 


Hct 


 


MCV 


 


MCH 


 


MCHC 


 


RDW 


 


Plt Count 


 


MPV 


 


Total Counted 


 


Neutrophils % 


 


Neutrophils % (Manual) 


 


Band Neutrophils % 


 


Lymphocytes % 


 


Lymphocytes % (Manual) 


 


Monocytes % (Manual) 


 


Eosinophils % (Manual) 


 


Sodium 


 


Potassium 


 


Chloride 


 


Carbon Dioxide 


 


Anion Gap 


 


BUN 


 


Creatinine 


 


Creat Clearance w eGFR 


 


Random Glucose 


 


Calcium 


 


Total Bilirubin 


 


AST 


 


ALT 


 


Alkaline Phosphatase 


 


Total Protein 


 


Albumin 


 


Urine Color  Feli


 


Urine Appearance  Clear


 


Urine pH  6.0


 


Ur Specific Gravity  1.015


 


Urine Protein  Negative


 


Urine Glucose (UA)  Negative


 


Urine Ketones  Negative


 


Urine Blood  Negative


 


Urine Nitrite  Negative


 


Urine Bilirubin  Negative


 


Urine Urobilinogen  4.0 e.u/dl


 


Ur Leukocyte Esterase  Negative


 


RPR Titer 


 


HIV 1&2 Antibody Screen 


 


HIV P24 Antigen 








Labs noted


Assessment: 





02/10/18 15:49


Withdrawal symptoms


Plan: 





Continue detox

## 2018-02-11 VITALS — DIASTOLIC BLOOD PRESSURE: 75 MMHG | SYSTOLIC BLOOD PRESSURE: 98 MMHG | HEART RATE: 79 BPM

## 2018-02-11 VITALS — TEMPERATURE: 96.2 F

## 2018-02-11 RX ADMIN — Medication SCH TAB: at 10:11

## 2018-02-11 NOTE — DS
[facility] Detox Discharge Summary


Admission Date: 


02/06/18





Discharge Date: 02/11/18





- History


Pertinent Past History: 





HTN


Anemia


Hep c


Depression





- Physical Exam Results


Vital Signs: 


 Vital Signs











Temperature  96.2 F L  02/11/18 06:16


 


Pulse Rate  79   02/11/18 09:24


 


Respiratory Rate  18   02/11/18 09:24


 


Blood Pressure  98/75   02/11/18 09:24


 


O2 Sat by Pulse Oximetry (%)      











Pertinent Admission Physical Exam Findings: 





withdrawal sx


 Vital Signs











Temperature  96.2 F L  02/11/18 06:16


 


Pulse Rate  79   02/11/18 09:24


 


Respiratory Rate  18   02/11/18 09:24


 


Blood Pressure  98/75   02/11/18 09:24


 


O2 Sat by Pulse Oximetry (%)      














- Treatment


Hospital Course: Detox Protocol Followed, Detoxed Safely, Responded well, 

Discharged Condition Good





- Medication


Discharge Medications: 


Ambulatory Orders





Metoprolol Succinate [Toprol XL -] 100 mg PO DAILY 30 Days #30 tab.sr.24h 02/11/ 18 











- Diagnosis


(1) Alcohol dependence with uncomplicated withdrawal


Status: Acute   





(2) Marijuana dependence


Status: Acute   





(3) Depression


Status: Chronic   





(4) Hepatitis C


Status: Chronic   


Qualifiers: 


   Viral hepatitis chronicity: carrier   Qualified Code(s): B18.2 - Chronic 

viral hepatitis C   





(5) Hypertension


Status: Chronic   


Qualifiers: 


   Hypertension type: essential hypertension   Qualified Code(s): I10 - 

Essential (primary) hypertension   





- AMA


Did Patient Leave Against Medical Advice: No